# Patient Record
Sex: FEMALE | Race: BLACK OR AFRICAN AMERICAN | NOT HISPANIC OR LATINO | Employment: STUDENT | URBAN - METROPOLITAN AREA
[De-identification: names, ages, dates, MRNs, and addresses within clinical notes are randomized per-mention and may not be internally consistent; named-entity substitution may affect disease eponyms.]

---

## 2018-01-20 ENCOUNTER — OFFICE VISIT (OUTPATIENT)
Dept: URGENT CARE | Facility: URGENT CARE | Age: 23
End: 2018-01-20
Payer: COMMERCIAL

## 2018-01-20 VITALS
HEIGHT: 63 IN | OXYGEN SATURATION: 97 % | HEART RATE: 89 BPM | BODY MASS INDEX: 27.12 KG/M2 | TEMPERATURE: 98.2 F | SYSTOLIC BLOOD PRESSURE: 102 MMHG | DIASTOLIC BLOOD PRESSURE: 70 MMHG | WEIGHT: 153.1 LBS | RESPIRATION RATE: 16 BRPM

## 2018-01-20 DIAGNOSIS — K52.9 GASTROENTERITIS: Primary | ICD-10-CM

## 2018-01-20 PROCEDURE — 99203 OFFICE O/P NEW LOW 30 MIN: CPT | Performed by: NURSE PRACTITIONER

## 2018-01-20 ASSESSMENT — ENCOUNTER SYMPTOMS
CONSTITUTIONAL NEGATIVE: 1
PSYCHIATRIC NEGATIVE: 1
HEARTBURN: 1
MUSCULOSKELETAL NEGATIVE: 1
DIARRHEA: 1
ABDOMINAL PAIN: 1
CARDIOVASCULAR NEGATIVE: 1
RESPIRATORY NEGATIVE: 1
NAUSEA: 1
NEUROLOGICAL NEGATIVE: 1

## 2018-01-20 NOTE — NURSING NOTE
"Chief Complaint   Patient presents with     Vomiting     vomiting and stomach upset for over a week, chills and fevers, looser stools, terrible heart burn x 1 week       Initial /70  Pulse 89  Temp 98.2  F (36.8  C)  Resp 16  Ht 5' 3\" (1.6 m)  Wt 153 lb 1.6 oz (69.4 kg)  SpO2 97%  BMI 27.12 kg/m2 Estimated body mass index is 27.12 kg/(m^2) as calculated from the following:    Height as of this encounter: 5' 3\" (1.6 m).    Weight as of this encounter: 153 lb 1.6 oz (69.4 kg).  Medication Reconciliation: complete      Rosaura Lara, GEGE    "

## 2018-01-20 NOTE — PATIENT INSTRUCTIONS
Noninfectious Gastroenteritis (Ages 6 Years to Adult)    Gastroenteritis can cause nausea, vomiting, diarrhea, and abdominal cramping. This may occur as a result of food sensitivity, inflammation of your gastrointestinal tract, medicines, stress, or other causes not related to infection. Your symptoms will usually last from 1 to 3 days, but can last longer. Antibiotics are not effective, but simple home treatment will be helpful.  Home care  Medicine    You may use acetaminophen or NSAID medicines like ibuprofen or naproxen to control fever, unless another medicine is prescribed. (Note: If you have chronic liver or kidney disease, or ever had a stomach ulcer or gastrointestinalI bleeding, talk with your healthcare provider before using these medicines.) Aspirin should never be used in anyone under 18 years of age who is ill with a fever. It may cause severe liver damage. Don't increase your NSAID medicines if you are already taking these medicines for another condition (like arthritis). Don't use NSAIDS if you are on aspirin (such as for heart disease, or after a stroke).    If medicines for diarrhea or vomiting are prescribed, take only as directed.  General care and preventing spread of the illness    If symptoms are severe, rest at home for the next 24 hours or until you feel better.    Hand washing with soap and water is the best way to prevent the spread of infection. Wash your hands after touching anyone who is sick.    Wash your hands after using the toilet and before meals. Clean the toilet after each use.    Caffeine, tobacco, and alcohol can make your diarrhea, cramping, and pain worse.  Diet    Water and clear liquids are important so you do not get dehydrated. Drink a small amount at a time.    Do not force yourself to eat, especially if you have cramps, vomiting, or diarrhea. When you finally decide to start eating, do not eat large amounts at a time, even if you are hungry.    If you eat, avoid  fatty, greasy, spicy, or fried foods.    Do not eat dairy products if you have diarrhea; they can make the diarrhea worse.  During the first 24 hours (the first full day), follow the diet below:    Beverages: Water, clear liquids, soft drinks without caffeine, like ginger ale; mineral water (plain or flavored); decaffeinated tea and coffee.    Soups: Clear broth, consommé, and bouillon Sports drinks aren't a good choice because they have too much sugar and not enough electrolytes. In this case, commercially available products called oral rehydration solutions are best.    Desserts: Plain gelatin, popsicles, and fruit juice bars.  During the next 24 hours (the second day), you may add the following to the above if you have improved. If not, continue what you did the first day:    Hot cereal, plain toast, bread, rolls, crackers    Plain noodles, rice, mashed potatoes, chicken noodle or rice soup    Unsweetened canned fruit (avoid pineapple), bananas    Limit caffeine and chocolate. No spices or seasonings except salt.  During the next 24 hours    Gradually resume a normal diet, as you feel better and your symptoms improve.    If at any time your symptoms start getting worse, go back to clear liquids until you feel better.  Food preparation    If you have diarrhea, you should not prepare food for others. When you  prepare food for yourself, wash your hands before and after.    Wash your hands after using cutting boards, countertops, and knives that have been in contact with raw food.    Keep uncooked meats away from cooked and ready-to-eat foods.  Follow-up care  Follow up with your healthcare provider if you are not improving over the next 2 to 3 days, or as advised. If a stool (diarrhea) sample was taken, call for the results as directed.  When to seek medical care  Call your healthcare provider right away if any of these occur:     Increasing abdominal pain or constant lower right abdominal pain    Continued  vomiting (unable to keep liquids down)    Frequent diarrhea (more than 5 times a day)    Blood in vomit or stool (black or red color)    Inability to tolerate solid food after a few days.    Dark urine, reduced urine output    Weakness, dizziness    Drowsiness    Fever of 100.4 F (38.0 C) or higher, or as directed by your healthcare provider    New rash  Call 911  Call 911 if any of these occur:    Trouble breathing    Chest pain    Confusion    Severe drowsiness or trouble awakening    Seizure    Stiff neck  Date Last Reviewed: 11/16/2015 2000-2017 [employer]. [address] [place] [address]. All rights reserved. This information is not intended as a substitute for professional medical care. Always follow your healthcare professional's instructions.        Tips to Control Acid Reflux    To control acid reflux, you ll need to make some basic diet and lifestyle changes. The simple steps outlined below may be all you ll need to ease discomfort.  Watch what you eat    Avoid fatty foods and spicy foods.    Eat fewer acidic foods, such as citrus and tomato-based foods. These can increase symptoms.    Limit drinking alcohol, caffeine, and fizzy beverages. All increase acid reflux.    Try limiting chocolate, peppermint, and spearmint. These can worsen acid reflux in some people.  Watch when you eat    Avoid lying down for 3 hours after eating.    Do not snack before going to bed.  Raise your head  Raising your head and upper body by 4 to 6 inches helps limit reflux when you re lying down. Put blocks under the head of your bed frame to raise it.  Other changes    Lose weight, if you need to    Don t exercise near bedtime    Avoid tight-fitting clothes    Limit aspirin and ibuprofen    Stop smoking   Date Last Reviewed: 7/1/2016 2000-2017 [employer]. [address] [place] [address]. All rights reserved. This information is not intended as a substitute for professional  medical care. Always follow your healthcare professional's instructions.        What Is GERD?     With GERD, the weak LES allows food and fluids to travel back, or reflux, into the esophagus.      If you often have a painful burning feeling in your chest after you eat, you may have gastroesophageal reflux disease (GERD). Heartburn that keeps coming back is a classic symptom of GERD. But you may have other symptoms as well. A GERD diagnosis is made only after a complete evaluation by your healthcare provider.  Note: Chest pain may also be caused by heart problems. Be sure to have all chest pain evaluated by a healthcare provider.   When you have a reflux problem  After you eat, food travels from your mouth down the esophagus to your stomach. Along the way, food passes through a one-way valve called the lower esophageal sphincter (LES). The LES sits at the opening to your stomach. Normally the LES opens when you swallow. It lets food enter the stomach, then closes quickly. With GERD, the LES doesn t work normally. It lets food and stomach acid flow back (reflux) into the esophagus.  Some common symptoms    Frequent heartburn or burping    Sour-tasting fluid backing up into your mouth    Symptoms that get worse after you eat, bend over, or lie down    Trouble swallowing or pain when swallowing    A dry, long-term (chronic) cough    Upset stomach (nausea) or vomiting  Relieving your discomfort  You and your healthcare provider can work together to find the treatment options that best ease your symptoms. These may include lifestyle changes, medicine, and possibly surgery.  Many people find their GERD symptoms decrease when they eat small frequent meals instead of 3 large ones. Reducing the amount of fatty foods in your diet will also help.   The following foods tend to cause problems for people diagnosed with GERD:    Tomatoes and tomato products    Alcohol    Coffee    Peppermint    Greasy or spicy foods  Talk with your  provider if you don t understand how to make the dietary changes needed to control your GERD symptoms. Your provider can refer you to a nutritionist.  Date Last Reviewed: 7/1/2016 2000-2017 [employer]. [address] [place] [address], [place] [address]. All rights reserved. This information is not intended as a substitute for professional medical care. Always follow your healthcare professional's instructions.

## 2018-01-20 NOTE — PROGRESS NOTES
"SUBJECTIVE:   Fiona Mak is a 22 year old female presenting with a chief complaint of   Chief Complaint   Patient presents with     Vomiting     vomiting and stomach upset for over a week, chills and fevers, looser stools, terrible heart burn x 1 week   .    Onset of symptoms was 1 week(s) ago.  Course of illness is improving.    Severity moderately severe  Current and Associated symptoms:  cramping, diarrhea and heartburn  Aggravating factors: spicy foods, fatty foods and lying flat, worse in morning.    Alleviating factors:antacids, belching, flatus and bowel movements  Diarrhea: Yes  3 stools/day and is resolved  Stools: frequent  Vomitting: Yes  3 times/day and is resolved  Appetite: decreased  Risk factors: hx of gastritis/reflux--not treated currently      Review of Systems   Constitutional: Negative.    HENT: Negative.    Respiratory: Negative.    Cardiovascular: Negative.    Gastrointestinal: Positive for abdominal pain, diarrhea, heartburn and nausea.   Genitourinary: Negative.    Musculoskeletal: Negative.    Skin: Negative.    Neurological: Negative.    Psychiatric/Behavioral: Negative.          No past medical history on file.  Current Outpatient Prescriptions   Medication Sig Dispense Refill     ALPRAZolam (XANAX PO)        Methylphenidate HCl (RITALIN PO)        BuPROPion HCl (WELLBUTRIN PO) Take 150 mg by mouth daily       Social History   Substance Use Topics     Smoking status: Never Smoker     Smokeless tobacco: Not on file      Comment: non smoking home     Alcohol use Not on file       OBJECTIVE  /70  Pulse 89  Temp 98.2  F (36.8  C)  Resp 16  Ht 5' 3\" (1.6 m)  Wt 153 lb 1.6 oz (69.4 kg)  SpO2 97%  BMI 27.12 kg/m2    Physical Exam   Constitutional: She is oriented to person, place, and time and well-developed, well-nourished, and in no distress. No distress.   HENT:   Head: Normocephalic and atraumatic.   Eyes: Conjunctivae and EOM are normal. Pupils are equal, round, and reactive " to light.   Neck: Normal range of motion. Neck supple.   Cardiovascular: Normal rate, regular rhythm and normal heart sounds.    Pulmonary/Chest: Effort normal and breath sounds normal.   Abdominal: Soft. She exhibits no distension and no mass. There is tenderness. There is no rebound and no guarding.   Musculoskeletal: Normal range of motion. She exhibits no edema.   Lymphadenopathy:     She has no cervical adenopathy.   Neurological: She is alert and oriented to person, place, and time.   Skin: Skin is warm and dry. No rash noted. She is not diaphoretic. No erythema. No pallor.   Psychiatric: Affect normal.       Labs:  No results found for this or any previous visit (from the past 24 hour(s)).    X-Ray was not done.    ASSESSMENT:      ICD-10-CM    1. Gastroenteritis K52.9 omeprazole (PRILOSEC) 20 MG CR capsule     Probiotic Product (ACIDOPHILUS PROBIOTIC BLEND) TABS        Medical Decision Making:    Differential Diagnosis:  viral gastroenteritis    Serious Comorbid Conditions:  Adult:  reflux    PLAN:    Gastro: BRAT Diet, Omeprazole/PPI and probiotics  PPI for 2 weeks, then transition to H2 blocker, follow up with PCP if recurrs    Followup:    If not improving or if condition worsens, follow up with your Primary Care Provider    Patient Instructions       Noninfectious Gastroenteritis (Ages 6 Years to Adult)    Gastroenteritis can cause nausea, vomiting, diarrhea, and abdominal cramping. This may occur as a result of food sensitivity, inflammation of your gastrointestinal tract, medicines, stress, or other causes not related to infection. Your symptoms will usually last from 1 to 3 days, but can last longer. Antibiotics are not effective, but simple home treatment will be helpful.  Home care  Medicine    You may use acetaminophen or NSAID medicines like ibuprofen or naproxen to control fever, unless another medicine is prescribed. (Note: If you have chronic liver or kidney disease, or ever had a stomach ulcer  or gastrointestinalI bleeding, talk with your healthcare provider before using these medicines.) Aspirin should never be used in anyone under 18 years of age who is ill with a fever. It may cause severe liver damage. Don't increase your NSAID medicines if you are already taking these medicines for another condition (like arthritis). Don't use NSAIDS if you are on aspirin (such as for heart disease, or after a stroke).    If medicines for diarrhea or vomiting are prescribed, take only as directed.  General care and preventing spread of the illness    If symptoms are severe, rest at home for the next 24 hours or until you feel better.    Hand washing with soap and water is the best way to prevent the spread of infection. Wash your hands after touching anyone who is sick.    Wash your hands after using the toilet and before meals. Clean the toilet after each use.    Caffeine, tobacco, and alcohol can make your diarrhea, cramping, and pain worse.  Diet    Water and clear liquids are important so you do not get dehydrated. Drink a small amount at a time.    Do not force yourself to eat, especially if you have cramps, vomiting, or diarrhea. When you finally decide to start eating, do not eat large amounts at a time, even if you are hungry.    If you eat, avoid fatty, greasy, spicy, or fried foods.    Do not eat dairy products if you have diarrhea; they can make the diarrhea worse.  During the first 24 hours (the first full day), follow the diet below:    Beverages: Water, clear liquids, soft drinks without caffeine, like ginger ale; mineral water (plain or flavored); decaffeinated tea and coffee.    Soups: Clear broth, consommé, and bouillon Sports drinks aren't a good choice because they have too much sugar and not enough electrolytes. In this case, commercially available products called oral rehydration solutions are best.    Desserts: Plain gelatin, popsicles, and fruit juice bars.  During the next 24 hours (the second  day), you may add the following to the above if you have improved. If not, continue what you did the first day:    Hot cereal, plain toast, bread, rolls, crackers    Plain noodles, rice, mashed potatoes, chicken noodle or rice soup    Unsweetened canned fruit (avoid pineapple), bananas    Limit caffeine and chocolate. No spices or seasonings except salt.  During the next 24 hours    Gradually resume a normal diet, as you feel better and your symptoms improve.    If at any time your symptoms start getting worse, go back to clear liquids until you feel better.  Food preparation    If you have diarrhea, you should not prepare food for others. When you  prepare food for yourself, wash your hands before and after.    Wash your hands after using cutting boards, countertops, and knives that have been in contact with raw food.    Keep uncooked meats away from cooked and ready-to-eat foods.  Follow-up care  Follow up with your healthcare provider if you are not improving over the next 2 to 3 days, or as advised. If a stool (diarrhea) sample was taken, call for the results as directed.  When to seek medical care  Call your healthcare provider right away if any of these occur:     Increasing abdominal pain or constant lower right abdominal pain    Continued vomiting (unable to keep liquids down)    Frequent diarrhea (more than 5 times a day)    Blood in vomit or stool (black or red color)    Inability to tolerate solid food after a few days.    Dark urine, reduced urine output    Weakness, dizziness    Drowsiness    Fever of 100.4 F (38.0 C) or higher, or as directed by your healthcare provider    New rash  Call 911  Call 911 if any of these occur:    Trouble breathing    Chest pain    Confusion    Severe drowsiness or trouble awakening    Seizure    Stiff neck  Date Last Reviewed: 11/16/2015 2000-2017 [employer]. [address] [place] [address], [place] [address]. All rights reserved. This information is not intended  as a substitute for professional medical care. Always follow your healthcare professional's instructions.        Tips to Control Acid Reflux    To control acid reflux, you ll need to make some basic diet and lifestyle changes. The simple steps outlined below may be all you ll need to ease discomfort.  Watch what you eat    Avoid fatty foods and spicy foods.    Eat fewer acidic foods, such as citrus and tomato-based foods. These can increase symptoms.    Limit drinking alcohol, caffeine, and fizzy beverages. All increase acid reflux.    Try limiting chocolate, peppermint, and spearmint. These can worsen acid reflux in some people.  Watch when you eat    Avoid lying down for 3 hours after eating.    Do not snack before going to bed.  Raise your head  Raising your head and upper body by 4 to 6 inches helps limit reflux when you re lying down. Put blocks under the head of your bed frame to raise it.  Other changes    Lose weight, if you need to    Don t exercise near bedtime    Avoid tight-fitting clothes    Limit aspirin and ibuprofen    Stop smoking   Date Last Reviewed: 7/1/2016 2000-2017 [employer]. [address] [place] [address]. All rights reserved. This information is not intended as a substitute for professional medical care. Always follow your healthcare professional's instructions.        What Is GERD?     With GERD, the weak LES allows food and fluids to travel back, or reflux, into the esophagus.      If you often have a painful burning feeling in your chest after you eat, you may have gastroesophageal reflux disease (GERD). Heartburn that keeps coming back is a classic symptom of GERD. But you may have other symptoms as well. A GERD diagnosis is made only after a complete evaluation by your healthcare provider.  Note: Chest pain may also be caused by heart problems. Be sure to have all chest pain evaluated by a healthcare provider.   When you have a reflux problem  After you  eat, food travels from your mouth down the esophagus to your stomach. Along the way, food passes through a one-way valve called the lower esophageal sphincter (LES). The LES sits at the opening to your stomach. Normally the LES opens when you swallow. It lets food enter the stomach, then closes quickly. With GERD, the LES doesn t work normally. It lets food and stomach acid flow back (reflux) into the esophagus.  Some common symptoms    Frequent heartburn or burping    Sour-tasting fluid backing up into your mouth    Symptoms that get worse after you eat, bend over, or lie down    Trouble swallowing or pain when swallowing    A dry, long-term (chronic) cough    Upset stomach (nausea) or vomiting  Relieving your discomfort  You and your healthcare provider can work together to find the treatment options that best ease your symptoms. These may include lifestyle changes, medicine, and possibly surgery.  Many people find their GERD symptoms decrease when they eat small frequent meals instead of 3 large ones. Reducing the amount of fatty foods in your diet will also help.   The following foods tend to cause problems for people diagnosed with GERD:    Tomatoes and tomato products    Alcohol    Coffee    Peppermint    Greasy or spicy foods  Talk with your provider if you don t understand how to make the dietary changes needed to control your GERD symptoms. Your provider can refer you to a nutritionist.  Date Last Reviewed: 7/1/2016 2000-2017 [employer]. [address] [place] [address], [place] [address]. All rights reserved. This information is not intended as a substitute for professional medical care. Always follow your healthcare professional's instructions.

## 2018-01-20 NOTE — MR AVS SNAPSHOT
After Visit Summary   1/20/2018    Fiona Mak    MRN: [id number]           Patient Information     Date Of Birth          1995        Visit Information        Provider Department      1/20/2018 9:20 AM Amarilis Rodriguez APRN [facility]        Today's Diagnoses     Gastroenteritis    -  1      Care Instructions      Noninfectious Gastroenteritis (Ages 6 Years to Adult)    Gastroenteritis can cause nausea, vomiting, diarrhea, and abdominal cramping. This may occur as a result of food sensitivity, inflammation of your gastrointestinal tract, medicines, stress, or other causes not related to infection. Your symptoms will usually last from 1 to 3 days, but can last longer. Antibiotics are not effective, but simple home treatment will be helpful.  Home care  Medicine    You may use acetaminophen or NSAID medicines like ibuprofen or naproxen to control fever, unless another medicine is prescribed. (Note: If you have chronic liver or kidney disease, or ever had a stomach ulcer or gastrointestinalI bleeding, talk with your healthcare provider before using these medicines.) Aspirin should never be used in anyone under 18 years of age who is ill with a fever. It may cause severe liver damage. Don't increase your NSAID medicines if you are already taking these medicines for another condition (like arthritis). Don't use NSAIDS if you are on aspirin (such as for heart disease, or after a stroke).    If medicines for diarrhea or vomiting are prescribed, take only as directed.  General care and preventing spread of the illness    If symptoms are severe, rest at home for the next 24 hours or until you feel better.    Hand washing with soap and water is the best way to prevent the spread of infection. Wash your hands after touching anyone who is sick.    Wash your hands after using the toilet and before meals. Clean the toilet after each use.    Caffeine, tobacco, and  alcohol can make your diarrhea, cramping, and pain worse.  Diet    Water and clear liquids are important so you do not get dehydrated. Drink a small amount at a time.    Do not force yourself to eat, especially if you have cramps, vomiting, or diarrhea. When you finally decide to start eating, do not eat large amounts at a time, even if you are hungry.    If you eat, avoid fatty, greasy, spicy, or fried foods.    Do not eat dairy products if you have diarrhea; they can make the diarrhea worse.  During the first 24 hours (the first full day), follow the diet below:    Beverages: Water, clear liquids, soft drinks without caffeine, like ginger ale; mineral water (plain or flavored); decaffeinated tea and coffee.    Soups: Clear broth, consommé, and bouillon Sports drinks aren't a good choice because they have too much sugar and not enough electrolytes. In this case, commercially available products called oral rehydration solutions are best.    Desserts: Plain gelatin, popsicles, and fruit juice bars.  During the next 24 hours (the second day), you may add the following to the above if you have improved. If not, continue what you did the first day:    Hot cereal, plain toast, bread, rolls, crackers    Plain noodles, rice, mashed potatoes, chicken noodle or rice soup    Unsweetened canned fruit (avoid pineapple), bananas    Limit caffeine and chocolate. No spices or seasonings except salt.  During the next 24 hours    Gradually resume a normal diet, as you feel better and your symptoms improve.    If at any time your symptoms start getting worse, go back to clear liquids until you feel better.  Food preparation    If you have diarrhea, you should not prepare food for others. When you  prepare food for yourself, wash your hands before and after.    Wash your hands after using cutting boards, countertops, and knives that have been in contact with raw food.    Keep uncooked meats away from cooked and ready-to-eat  foods.  Follow-up care  Follow up with your healthcare provider if you are not improving over the next 2 to 3 days, or as advised. If a stool (diarrhea) sample was taken, call for the results as directed.  When to seek medical care  Call your healthcare provider right away if any of these occur:     Increasing abdominal pain or constant lower right abdominal pain    Continued vomiting (unable to keep liquids down)    Frequent diarrhea (more than 5 times a day)    Blood in vomit or stool (black or red color)    Inability to tolerate solid food after a few days.    Dark urine, reduced urine output    Weakness, dizziness    Drowsiness    Fever of 100.4 F (38.0 C) or higher, or as directed by your healthcare provider    New rash  Call 911  Call 911 if any of these occur:    Trouble breathing    Chest pain    Confusion    Severe drowsiness or trouble awakening    Seizure    Stiff neck  Date Last Reviewed: 11/16/2015 2000-2017 [employer]. [address] [place] [address]. All rights reserved. This information is not intended as a substitute for professional medical care. Always follow your healthcare professional's instructions.        Tips to Control Acid Reflux    To control acid reflux, you ll need to make some basic diet and lifestyle changes. The simple steps outlined below may be all you ll need to ease discomfort.  Watch what you eat    Avoid fatty foods and spicy foods.    Eat fewer acidic foods, such as citrus and tomato-based foods. These can increase symptoms.    Limit drinking alcohol, caffeine, and fizzy beverages. All increase acid reflux.    Try limiting chocolate, peppermint, and spearmint. These can worsen acid reflux in some people.  Watch when you eat    Avoid lying down for 3 hours after eating.    Do not snack before going to bed.  Raise your head  Raising your head and upper body by 4 to 6 inches helps limit reflux when you re lying down. Put blocks under the head of your  bed frame to raise it.  Other changes    Lose weight, if you need to    Don t exercise near bedtime    Avoid tight-fitting clothes    Limit aspirin and ibuprofen    Stop smoking   Date Last Reviewed: 7/1/2016 2000-2017 The "[employer]. [address] [place] [address], [place] [address]. All rights reserved. This information is not intended as a substitute for professional medical care. Always follow your healthcare professional's instructions.        What Is GERD?     With GERD, the weak LES allows food and fluids to travel back, or reflux, into the esophagus.      If you often have a painful burning feeling in your chest after you eat, you may have gastroesophageal reflux disease (GERD). Heartburn that keeps coming back is a classic symptom of GERD. But you may have other symptoms as well. A GERD diagnosis is made only after a complete evaluation by your healthcare provider.  Note: Chest pain may also be caused by heart problems. Be sure to have all chest pain evaluated by a healthcare provider.   When you have a reflux problem  After you eat, food travels from your mouth down the esophagus to your stomach. Along the way, food passes through a one-way valve called the lower esophageal sphincter (LES). The LES sits at the opening to your stomach. Normally the LES opens when you swallow. It lets food enter the stomach, then closes quickly. With GERD, the LES doesn t work normally. It lets food and stomach acid flow back (reflux) into the esophagus.  Some common symptoms    Frequent heartburn or burping    Sour-tasting fluid backing up into your mouth    Symptoms that get worse after you eat, bend over, or lie down    Trouble swallowing or pain when swallowing    A dry, long-term (chronic) cough    Upset stomach (nausea) or vomiting  Relieving your discomfort  You and your healthcare provider can work together to find the treatment options that best ease your symptoms. These may include lifestyle changes,  "medicine, and possibly surgery.  Many people find their GERD symptoms decrease when they eat small frequent meals instead of 3 large ones. Reducing the amount of fatty foods in your diet will also help.   The following foods tend to cause problems for people diagnosed with GERD:    Tomatoes and tomato products    Alcohol    Coffee    Peppermint    Greasy or spicy foods  Talk with your provider if you don t understand how to make the dietary changes needed to control your GERD symptoms. Your provider can refer you to a nutritionist.  Date Last Reviewed: 7/1/2016 2000-2017 [employer]. [address] [place] [address]. All rights reserved. This information is not intended as a substitute for professional medical care. Always follow your healthcare professional's instructions.                Follow-ups after your visit        Who to contact     If you have questions or need follow up information about today's clinic visit or your schedule please contact [facility] directly at [phone].  Normal or non-critical lab and imaging results will be communicated to you by MyChart, letter or phone within 4 business days after the clinic has received the results. If you do not hear from us within 7 days, please contact the clinic through [employer] or phone. If you have a critical or abnormal lab result, we will notify you by phone as soon as possible.  Submit refill requests through [employer] or call your pharmacy and they will forward the refill request to us. Please allow 3 business days for your refill to be completed.          Additional Information About Your Visit        [employer] Information     [employer] lets you send messages to your doctor, view your test results, renew your prescriptions, schedule appointments and more. To sign up, go to [email or web address] . Click on \"Log in\" on the left side of the screen, which will take you to the Welcome page. Then click on " "\"Sign up Now\" on the right side of the page.     You will be asked to enter the access code listed below, as well as some personal information. Please follow the directions to create your username and password.     Your access code is: [email or web address]  Expires: 2018 10:08 AM     Your access code will  in 90 days. If you need help or a new code, please call your [facility] clinic or [phone].        Care EveryWhere ID     This is your Care EveryWhere ID. This could be used by other organizations to access your [facility] medical records  [id number]        Your Vitals Were     Pulse Temperature Respirations Height Pulse Oximetry BMI (Body Mass Index)    89 98.2  F (36.8  C) 16 5' 3\" (1.6 m) 97% 27.12 kg/m2       Blood Pressure from Last 3 Encounters:   18 102/70   02/07/15 111/74   12/15/12 92/60    Weight from Last 3 Encounters:   18 153 lb 1.6 oz (69.4 kg)   16 160 lb (72.6 kg)   02/06/15 160 lb (72.6 kg) (88 %)*     * Growth percentiles are based on CDC 2-20 Years data.              Today, you had the following     No orders found for display         Today's Medication Changes          These changes are accurate as of: 18 10:08 AM.  If you have any questions, ask your nurse or doctor.               Start taking these medicines.        Dose/Directions    ACIDOPHILUS PROBIOTIC BLEND Tabs   Used for:  Gastroenteritis        Dose:  1 tablet   Take 1 tablet by mouth 2 times daily   Quantity:  30 tablet   Refills:  0       omeprazole 20 MG CR capsule   Commonly known as:  priLOSEC   Used for:  Gastroenteritis        Dose:  20 mg   Take 1 capsule (20 mg) by mouth daily   Quantity:  14 capsule   Refills:  0            Where to get your medicines      These medications were sent to [facility]/pharmacy #[id number] - [place] [id number] [address]     Phone:  [phone]     ACIDOPHILUS PROBIOTIC BLEND Tabs    omeprazole 20 MG CR capsule                " Primary Care Provider Office Phone # Fax #    [facility] [phone]       [facility] [address]        Equal Access to Services     DIDI HOOK : Hadii aad ku hadcas Will, varshada luqjaime, luzta kaelbada ruddy, lalo grier bernarddavid espinal geraldine leon. So [facility] [phone].    ATENCIÓN: Si habla español, tiene a torres disposición servicios gratuitos de asistencia lingüística. Llame al [phone].    We comply with applicable federal civil rights laws and [place] laws. We do not discriminate on the basis of race, color, national origin, age, disability, sex, sexual orientation, or gender identity.            Thank you!     Thank you for choosing [facility]  for your care. Our goal is always to provide you with excellent care. Hearing back from our patients is one way we can continue to improve our services. Please take a few minutes to complete the written survey that you may receive in the mail after your visit with us. Thank you!             Your Updated Medication List - Protect others around you: Learn how to safely use, store and throw away your medicines at www.disposemymeds.org.          This list is accurate as of: 1/20/18 10:08 AM.  Always use your most recent med list.                   Brand Name Dispense Instructions for use Diagnosis    ACIDOPHILUS PROBIOTIC BLEND Tabs     30 tablet    Take 1 tablet by mouth 2 times daily    Gastroenteritis       omeprazole 20 MG CR capsule    priLOSEC    14 capsule    Take 1 capsule (20 mg) by mouth daily    Gastroenteritis       RITALIN PO           WELLBUTRIN PO      Take 150 mg by mouth daily        XANAX PO

## 2019-02-08 ENCOUNTER — OFFICE VISIT (OUTPATIENT)
Dept: URGENT CARE | Facility: URGENT CARE | Age: 24
End: 2019-02-08
Payer: COMMERCIAL

## 2019-02-08 VITALS
WEIGHT: 203.5 LBS | SYSTOLIC BLOOD PRESSURE: 110 MMHG | OXYGEN SATURATION: 98 % | HEART RATE: 87 BPM | RESPIRATION RATE: 16 BRPM | TEMPERATURE: 98.1 F | DIASTOLIC BLOOD PRESSURE: 74 MMHG | BODY MASS INDEX: 36.05 KG/M2

## 2019-02-08 DIAGNOSIS — B37.31 YEAST VAGINITIS: Primary | ICD-10-CM

## 2019-02-08 DIAGNOSIS — N94.9 VAGINAL SYMPTOM: ICD-10-CM

## 2019-02-08 DIAGNOSIS — N39.0 URINARY TRACT INFECTION WITHOUT HEMATURIA, SITE UNSPECIFIED: ICD-10-CM

## 2019-02-08 LAB
ALBUMIN UR-MCNC: NEGATIVE MG/DL
APPEARANCE UR: ABNORMAL
BACTERIA #/AREA URNS HPF: ABNORMAL /HPF
BILIRUB UR QL STRIP: NEGATIVE
COLOR UR AUTO: YELLOW
GLUCOSE UR STRIP-MCNC: NEGATIVE MG/DL
HGB UR QL STRIP: ABNORMAL
KETONES UR STRIP-MCNC: NEGATIVE MG/DL
LEUKOCYTE ESTERASE UR QL STRIP: ABNORMAL
NITRATE UR QL: NEGATIVE
NON-SQ EPI CELLS #/AREA URNS LPF: ABNORMAL /LPF
PH UR STRIP: 7.5 PH (ref 5–7)
RBC #/AREA URNS AUTO: ABNORMAL /HPF
SOURCE: ABNORMAL
SP GR UR STRIP: 1.02 (ref 1–1.03)
SPECIMEN SOURCE: ABNORMAL
UROBILINOGEN UR STRIP-ACNC: 0.2 EU/DL (ref 0.2–1)
WBC #/AREA URNS AUTO: ABNORMAL /HPF
WET PREP SPEC: ABNORMAL

## 2019-02-08 PROCEDURE — 99213 OFFICE O/P EST LOW 20 MIN: CPT | Performed by: PHYSICIAN ASSISTANT

## 2019-02-08 PROCEDURE — 81001 URINALYSIS AUTO W/SCOPE: CPT | Performed by: PHYSICIAN ASSISTANT

## 2019-02-08 PROCEDURE — 87210 SMEAR WET MOUNT SALINE/INK: CPT | Performed by: PHYSICIAN ASSISTANT

## 2019-02-08 RX ORDER — OLOPATADINE HYDROCHLORIDE 1 MG/ML
SOLUTION/ DROPS OPHTHALMIC
Refills: 0 | COMMUNITY
Start: 2018-07-31 | End: 2019-12-25

## 2019-02-08 RX ORDER — SULFAMETHOXAZOLE/TRIMETHOPRIM 800-160 MG
1 TABLET ORAL 2 TIMES DAILY
Qty: 6 TABLET | Refills: 0 | Status: SHIPPED | OUTPATIENT
Start: 2019-02-08 | End: 2019-02-11

## 2019-02-08 RX ORDER — LISDEXAMFETAMINE DIMESYLATE 20 MG/1
CAPSULE ORAL
Refills: 0 | COMMUNITY
Start: 2018-10-12 | End: 2019-12-25

## 2019-02-08 RX ORDER — ALPRAZOLAM 0.5 MG
TABLET ORAL
Refills: 0 | COMMUNITY
Start: 2018-12-07 | End: 2019-12-25

## 2019-02-08 RX ORDER — FLUCONAZOLE 150 MG/1
150 TABLET ORAL ONCE
Qty: 1 TABLET | Refills: 0 | Status: SHIPPED | OUTPATIENT
Start: 2019-02-08 | End: 2019-02-08

## 2019-02-08 RX ORDER — FLUTICASONE PROPIONATE 50 MCG
SPRAY, SUSPENSION (ML) NASAL
Refills: 0 | COMMUNITY
Start: 2018-06-09 | End: 2019-12-25

## 2019-02-08 RX ORDER — LISDEXAMFETAMINE DIMESYLATE 30 MG/1
CAPSULE ORAL
Refills: 0 | COMMUNITY
Start: 2018-12-12 | End: 2019-12-25

## 2019-02-09 NOTE — PROGRESS NOTES
S: 24 yo female is here for vaginal itching/burning and dysuria for 2 weeks.  LMP January/18/2018 which was normal.  No fever.  No pelvic pain.  No flank pain.  No headache or dizziness.        No Known Allergies    No past medical history on file.      Current Outpatient Medications on File Prior to Visit:  VYVANSE 30 MG capsule TAKE ONE CAPSULE BY MOUTH EVERY DAY FILL 10/20/2018   ALPRAZolam (XANAX) 0.5 MG tablet TAKE 1 TABLET BY MOUTH 2 TIMES DAILY IF NEEDED.   benzoyl peroxide 5 % external liquid    BuPROPion HCl (WELLBUTRIN PO) Take 150 mg by mouth daily   FLUoxetine (PROZAC) 20 MG capsule TAKE 1 CAPSULE BY MOUTH EVERY DAY IN THE MORNING   fluticasone (FLONASE) 50 MCG/ACT nasal spray INHALE 2 SPRAYS INTO BOTH NOSTRILS ONCE DAILY.   Methylphenidate HCl (RITALIN PO)    olopatadine (PATANOL) 0.1 % ophthalmic solution INSTILL 1 DROP INTO BOTH EYES TWICE A DAY   omeprazole (PRILOSEC) 20 MG CR capsule Take 1 capsule (20 mg) by mouth daily (Patient not taking: Reported on 2/8/2019)   Probiotic Product (ACIDOPHILUS PROBIOTIC BLEND) TABS Take 1 tablet by mouth 2 times daily (Patient not taking: Reported on 2/8/2019)   VYVANSE 20 MG capsule TAKE 1 CAPSULE BY MOUTH EVERY DAY     No current facility-administered medications on file prior to visit.     Social History     Tobacco Use     Smoking status: Never Smoker     Smokeless tobacco: Never Used     Tobacco comment: non smoking home   Substance Use Topics     Alcohol use: Not on file     Drug use: Not on file       ROS:  General: negative for fever  ABD: Denies abd pain  : as above    OBJECTIVE:  /74 (BP Location: Left arm, Patient Position: Chair, Cuff Size: Adult Large)   Pulse 87   Temp 98.1  F (36.7  C) (Oral)   Resp 16   Wt 92.3 kg (203 lb 8 oz)   SpO2 98%   Breastfeeding? No   BMI 36.05 kg/m     General:   awake, alert, and cooperative.  NAD.   Head: Normocephalic, atraumatic.  Eyes: Conjunctiva clear, non icteric.   ABD: soft, no tenderness to  palpation , no rigidity, guarding or rebound . No CVAT  Neuro: Alert and oriented - normal speech.   Results for orders placed or performed in visit on 02/08/19   *UA reflex to Microscopic and Culture ([facility] (except [place] and [place])   Result Value Ref Range    Color Urine Yellow     Appearance Urine Slightly Cloudy     Glucose Urine Negative NEG^Negative mg/dL    Bilirubin Urine Negative NEG^Negative    Ketones Urine Negative NEG^Negative mg/dL    Specific Gravity Urine 1.020 1.003 - 1.035    Blood Urine Moderate (A) NEG^Negative    pH Urine 7.5 (H) 5.0 - 7.0 pH    Protein Albumin Urine Negative NEG^Negative mg/dL    Urobilinogen Urine 0.2 0.2 - 1.0 EU/dL    Nitrite Urine Negative NEG^Negative    Leukocyte Esterase Urine Small (A) NEG^Negative    Source Midstream Urine    Urine Microscopic   Result Value Ref Range    WBC Urine 0 - 5 OTO5^0 - 5 /HPF    RBC Urine 10-25 (A) OTO2^O - 2 /HPF    Squamous Epithelial /LPF Urine Few FEW^Few /LPF    Bacteria Urine Few (A) NEG^Negative /HPF   Wet prep   Result Value Ref Range    Specimen Description Vagina     Wet Prep No Trichomonas seen     Wet Prep No clue cells seen     Wet Prep Yeast seen (A)        ASSESSMENT:    ICD-10-CM    1. Yeast vaginitis B37.3 fluconazole (DIFLUCAN) 150 MG tablet     sulfamethoxazole-trimethoprim (BACTRIM DS/SEPTRA DS) 800-160 MG tablet   2. Vaginal symptom N94.9 Wet prep     *UA reflex to Microscopic and Culture ([facility] (except [place] and [place])     Urine Microscopic     fluconazole (DIFLUCAN) 150 MG tablet   3. Urinary tract infection without hematuria, site unspecified N39.0 sulfamethoxazole-trimethoprim (BACTRIM DS/SEPTRA DS) 800-160 MG tablet         PLAN:   As per ordered above.  Drink plenty of fluids.  Prevention and treatment of UTI's discussed. Follow up with primary care physician if not improving.  Advised about symptoms which might herald more serious problems.      Emilia MUSE  ISABELA Turner

## 2019-12-25 ENCOUNTER — APPOINTMENT (OUTPATIENT)
Dept: CT IMAGING | Facility: CLINIC | Age: 24
End: 2019-12-25
Attending: EMERGENCY MEDICINE
Payer: MEDICAID

## 2019-12-25 ENCOUNTER — HOSPITAL ENCOUNTER (EMERGENCY)
Facility: CLINIC | Age: 24
Discharge: HOME | End: 2019-12-26
Attending: EMERGENCY MEDICINE | Admitting: EMERGENCY MEDICINE
Payer: MEDICAID

## 2019-12-25 DIAGNOSIS — F43.0 ACUTE REACTION TO STRESS: ICD-10-CM

## 2019-12-25 LAB
ALBUMIN SERPL-MCNC: 4 G/DL (ref 3.4–5)
ALBUMIN UR-MCNC: 100 MG/DL
ALP SERPL-CCNC: 57 U/L (ref 40–150)
ALT SERPL W P-5'-P-CCNC: 16 U/L (ref 0–50)
AMPHETAMINES UR QL SCN: NEGATIVE
ANION GAP SERPL CALCULATED.3IONS-SCNC: 7 MMOL/L (ref 3–14)
APAP SERPL-MCNC: <2 MG/L (ref 10–20)
APPEARANCE UR: ABNORMAL
AST SERPL W P-5'-P-CCNC: 16 U/L (ref 0–45)
B-HCG FREE SERPL-ACNC: <5 IU/L
BARBITURATES UR QL: NEGATIVE
BASOPHILS # BLD AUTO: 0 10E9/L (ref 0–0.2)
BASOPHILS NFR BLD AUTO: 0.1 %
BENZODIAZ UR QL: NEGATIVE
BILIRUB SERPL-MCNC: 0.4 MG/DL (ref 0.2–1.3)
BILIRUB UR QL STRIP: NEGATIVE
BUN SERPL-MCNC: 10 MG/DL (ref 7–30)
CALCIUM SERPL-MCNC: 9.5 MG/DL (ref 8.5–10.1)
CANNABINOIDS UR QL SCN: POSITIVE
CHLORIDE SERPL-SCNC: 106 MMOL/L (ref 94–109)
CO2 SERPL-SCNC: 24 MMOL/L (ref 20–32)
COCAINE UR QL: NEGATIVE
COLOR UR AUTO: ABNORMAL
CREAT SERPL-MCNC: 0.71 MG/DL (ref 0.52–1.04)
DIFFERENTIAL METHOD BLD: ABNORMAL
EOSINOPHIL # BLD AUTO: 0 10E9/L (ref 0–0.7)
EOSINOPHIL NFR BLD AUTO: 0 %
ERYTHROCYTE [DISTWIDTH] IN BLOOD BY AUTOMATED COUNT: 13.2 % (ref 10–15)
GFR SERPL CREATININE-BSD FRML MDRD: >90 ML/MIN/{1.73_M2}
GLUCOSE SERPL-MCNC: 100 MG/DL (ref 70–99)
GLUCOSE UR STRIP-MCNC: NEGATIVE MG/DL
HCT VFR BLD AUTO: 38.7 % (ref 35–47)
HGB BLD-MCNC: 13 G/DL (ref 11.7–15.7)
HGB UR QL STRIP: ABNORMAL
IMM GRANULOCYTES # BLD: 0 10E9/L (ref 0–0.4)
IMM GRANULOCYTES NFR BLD: 0.2 %
KETONES UR STRIP-MCNC: 10 MG/DL
LEUKOCYTE ESTERASE UR QL STRIP: ABNORMAL
LYMPHOCYTES # BLD AUTO: 1.1 10E9/L (ref 0.8–5.3)
LYMPHOCYTES NFR BLD AUTO: 9.8 %
MCH RBC QN AUTO: 29.3 PG (ref 26.5–33)
MCHC RBC AUTO-ENTMCNC: 33.6 G/DL (ref 31.5–36.5)
MCV RBC AUTO: 87 FL (ref 78–100)
MONOCYTES # BLD AUTO: 0.6 10E9/L (ref 0–1.3)
MONOCYTES NFR BLD AUTO: 5.3 %
NEUTROPHILS # BLD AUTO: 9.4 10E9/L (ref 1.6–8.3)
NEUTROPHILS NFR BLD AUTO: 84.6 %
NITRATE UR QL: NEGATIVE
NRBC # BLD AUTO: 0 10*3/UL
NRBC BLD AUTO-RTO: 0 /100
OPIATES UR QL SCN: NEGATIVE
PCP UR QL SCN: NEGATIVE
PH UR STRIP: 6 PH (ref 5–7)
PLATELET # BLD AUTO: 322 10E9/L (ref 150–450)
POTASSIUM SERPL-SCNC: 3.9 MMOL/L (ref 3.4–5.3)
PROT SERPL-MCNC: 8.2 G/DL (ref 6.8–8.8)
RBC # BLD AUTO: 4.44 10E12/L (ref 3.8–5.2)
RBC #/AREA URNS AUTO: >182 /HPF (ref 0–2)
SALICYLATES SERPL-MCNC: <2 MG/DL
SODIUM SERPL-SCNC: 137 MMOL/L (ref 133–144)
SOURCE: ABNORMAL
SP GR UR STRIP: 1.02 (ref 1–1.03)
SQUAMOUS #/AREA URNS AUTO: 18 /HPF (ref 0–1)
UROBILINOGEN UR STRIP-MCNC: NORMAL MG/DL (ref 0–2)
WBC # BLD AUTO: 11.2 10E9/L (ref 4–11)
WBC #/AREA URNS AUTO: 24 /HPF (ref 0–5)

## 2019-12-25 PROCEDURE — 99292 CRITICAL CARE ADDL 30 MIN: CPT

## 2019-12-25 PROCEDURE — 84702 CHORIONIC GONADOTROPIN TEST: CPT

## 2019-12-25 PROCEDURE — 80329 ANALGESICS NON-OPIOID 1 OR 2: CPT | Performed by: EMERGENCY MEDICINE

## 2019-12-25 PROCEDURE — 96372 THER/PROPH/DIAG INJ SC/IM: CPT

## 2019-12-25 PROCEDURE — 99291 CRITICAL CARE FIRST HOUR: CPT | Mod: 25

## 2019-12-25 PROCEDURE — 80053 COMPREHEN METABOLIC PANEL: CPT | Performed by: EMERGENCY MEDICINE

## 2019-12-25 PROCEDURE — 85025 COMPLETE CBC W/AUTO DIFF WBC: CPT | Performed by: EMERGENCY MEDICINE

## 2019-12-25 PROCEDURE — 80307 DRUG TEST PRSMV CHEM ANLYZR: CPT | Performed by: EMERGENCY MEDICINE

## 2019-12-25 PROCEDURE — 70450 CT HEAD/BRAIN W/O DYE: CPT

## 2019-12-25 PROCEDURE — 90791 PSYCH DIAGNOSTIC EVALUATION: CPT

## 2019-12-25 PROCEDURE — 81001 URINALYSIS AUTO W/SCOPE: CPT | Performed by: EMERGENCY MEDICINE

## 2019-12-25 PROCEDURE — 25000128 H RX IP 250 OP 636: Performed by: EMERGENCY MEDICINE

## 2019-12-25 RX ORDER — OLANZAPINE 10 MG/2ML
10 INJECTION, POWDER, FOR SOLUTION INTRAMUSCULAR DAILY PRN
Status: DISCONTINUED | OUTPATIENT
Start: 2019-12-25 | End: 2019-12-26 | Stop reason: HOSPADM

## 2019-12-25 RX ORDER — OLANZAPINE 10 MG/1
10 TABLET, ORALLY DISINTEGRATING ORAL ONCE
Status: DISCONTINUED | OUTPATIENT
Start: 2019-12-25 | End: 2019-12-25

## 2019-12-25 RX ADMIN — OLANZAPINE 10 MG: 10 INJECTION, POWDER, FOR SOLUTION INTRAMUSCULAR at 17:28

## 2019-12-25 ASSESSMENT — ENCOUNTER SYMPTOMS
WOUND: 1
FEVER: 0
DYSPHORIC MOOD: 1

## 2019-12-25 ASSESSMENT — MIFFLIN-ST. JEOR: SCORE: 1458.49

## 2019-12-25 NOTE — ED NOTES
Bed: [facility]  Expected date: 12/25/19  Expected time: 10:42 AM  Means of arrival: Ambulance  Comments:  511 24f HOLD, restrained  ETA 1055

## 2019-12-25 NOTE — LETTER
December 25, 2019      To Whom It May Concern:      Fiona Mak was seen in our Emergency Department today, 12/25/19.      Sincerely,        Ancelmo Melissa MD

## 2019-12-25 NOTE — ED AVS SNAPSHOT
Emergency Department  [address] [place] [id number]  Phone:  [phone]  Fax:  [phone]                                    Fiona Mak   MRN: [id number]    Department:   Emergency Department   Date of Visit:  12/25/2019           After Visit Summary Signature Page    I have received my discharge instructions, and my questions have been answered. I have discussed any challenges I see with this plan with the nurse or doctor.    ..........................................................................................................................................  Patient/Patient Representative Signature      ..........................................................................................................................................  Patient Representative Print Name and Relationship to Patient    ..................................................               ................................................  Date                                   Time    ..........................................................................................................................................  Reviewed by Signature/Title    ...................................................              ..............................................  Date                                               Time          22EPIC Rev 08/18

## 2019-12-25 NOTE — ED PROVIDER NOTES
"  History     Chief Complaint:  Abnormal/Erratic Behavior    The history is provided by a parent and the EMS personnel. The history is limited by the condition of the patient.      Fiona Mak is a 24 year old female who presents via EMS for evaluation of abnormal behavior. This morning, the patient's mother came home from working an overnight shift to the patient \"acting strange and tearing her clothes off.\" Mom called 911 as the patient has never acted like this before. On PD's arrival, they reported the patient was fine but then suddenly \"flipped\" and tore all her clothes off and started running away. The paramedics found the patient completely naked, and unwilling to answer any questions asked of her rather she would just stare straight ahead. Here, the patient reports she got into a \"tiff\" with her mom and that mom is going through a divorce, but does not elaborate further. When prompted to discuss the event today, the patient states she is \"going through a lot\" and \"needs time to process\" all of it. After given her time, patient continues to not elaborate and states \"I don't feel like talking until my wounds are treated.\" Mother reported to EMS that the patient has no allergies, medical history, or current medications, however per chart review the patient has a history of anxiety.     Per patient's mother, the patient has been living on her own for three months now and has only told one brother where she is living. This morning, the patient drove to her mom's house and when mom arrived home, she was wearing a bedsheet on her bottom and a shirt on top; mom notes she was staring upwards and seemed to be responding to internal stimuli. When she tried to converse with the patient, patient would not respond until suddenly she stripped off her shirt and replaced it upside down. Mom does not believe the patient did any drugs, however police found an empty marijuana pipe in her car today.     Allergies:  No Known " "Drug Allergies     Medications:    Vyvanse     Past Medical History:    ADD  Anxiety    Past Surgical History:    The patient does not have any pertinent past surgical history.    Family History:    Mother: Diabetes    Social History:  Marital Status:  Single [1]  Presents to the ED with EMS. Mother and family arrived later.   Negative for tobacco use.  Negative for alcohol use.   Negative for drug use. Per mother.      Review of Systems   Constitutional: Negative for fever.   Skin: Positive for wound.   Psychiatric/Behavioral: Positive for behavioral problems and dysphoric mood.   10 point review of systems performed and is negative except as above and in HPI.       Physical Exam     Patient Vitals for the past 24 hrs:   BP Temp Temp src Heart Rate Resp SpO2 Height Weight   12/25/19 1112 121/76 97.8  F (36.6  C) Temporal 105 16 100 % 1.549 m (5' 1\") 77.1 kg (170 lb)        Physical Exam  General: Resting on the gurney, appears comfortable.   Head:  The scalp, face, and head appear normal  Mouth/Throat: Mucus membranes are moist   CV:  Regular rate    Normal S1 and S2  No pathological murmur   Resp:  Breath sounds clear and equal bilaterally    Non-labored, no retractions or accessory muscle use    No coarseness    No wheezing   GI:  Abdomen is soft, no rigidity    No tenderness to palpation  MS:  No evidence of self injury, no lacerations.   Skin:  Abrasions ot hte bilateral knees  Neuro: Speech is normal but exceedingly gaurded.     No apparent focal deficit.      Uses all extremities equally.  Psych:  Awake. Alert.      Unable to assess. Patient uncooperative with exam and refuses to answer any questions.  Very guarded.  Poor insight.  Poor judgement.    Emergency Department Course   Imaging:  Radiographic findings were communicated with the patient and family who voiced understanding of the findings.  CT Head without contrast:   No acute intracranial abnormality, as per radiology.     Laboratory:  CBC: WBC: 11.2 " (H), HGB: 13.0, PLT: 322  CMP: Glucose 100 (H), o/w WNL (Creatinine: 0.71)  Acetaminophen level: <2  Salicylate level: <2    UA with Microscopic: Ketones: 10 (A), Blood: Large (A), Albumin: 100 (A), Leukocyte esterase: Moderate (A), WBC: 24 (H), RBC: >182 (H), Squamous epithelial: 18 (H), o/w WNL   Drug abuse screen urine: Cannabinoids: Positive (A), o/w WNL    ISTAT HCG: <5.0    Procedures:  None    Emergency Department Course:  1052: Patient arrived in restraints to the ED via EMS. Paramedic notes and vitals reviewed.   1054: I performed an exam of the patient as documented above.      IV was inserted and blood was drawn for laboratory testing, results above.   The patient provided a urine sample here in the emergency department. This was sent for laboratory testing, findings above.   The patient was sent for a head CT while in the emergency department, results above.      1251: I discussed the plan going forwards for the patient with the DEC . If the urine drug screen comes back negative, she will evaluate the patient.     1330: I spoke with the DEC  and given the change in the patient's behavior between my exam and their assessment, we will plan on having a repeat DEC assessment at 1600.     Patient's care was signed out to Dr. Suárez at shift change pending repeat DEC assessment.     Impression & Plan      Medical Decision Making:  Fiona Mak is a 24 year old female who presents for evaluation of bizarre behavior.  They have no history of previous psychiatric illness other than ADD and anxiety but at this point appear decompensated psychiatrically.  The patient is exceptionally guarded and will not answer my questions, however, her behavior is concerning for psychosis.    They no signs at this point of suicide attempt or ingestion, corroberated by the laboratory data above.      The patient was seen by DEC and continued to be quite guarded but had more logical answers to questioning. The  patient's behavior varied between my exam and the DEC assessment so plan will be repeat assessment at 1600. Patient's care signed out to my colleague at shift change pending repeat deck assessment for disposition.     Diagnosis:    ICD-10-CM    1. Psychosis, unspecified psychosis type (H) F29        Disposition:  Signed out to pending repeat DEC assessment    Scribe Disclosure:  CAMI Angi Vito, am serving as a scribe on 12/25/2019 at 11:52 AM to personally document services performed by Kristin Camejo MD based on my observations and the provider's statements to me.      12/25/2019    EMERGENCY DEPARTMENT       Kristin Camejo MD  12/25/19 6424

## 2019-12-25 NOTE — ED PROVIDER NOTES
Taken in sign-out from Dr. Camejo. Seen by DEC, new onset psychosis. Admit.     Ancelmo Melissa MD  12/25/19 2659

## 2019-12-25 NOTE — ED NOTES
Pt asking many questions about the names on her try and defensive and guarded; family was told they need to wait to see her; they went for dinner

## 2019-12-25 NOTE — ED NOTES
Restraints were removed upon arrival.  Patient was naked.  Abrasions noted to bilat. Knees.  MD present.  Patient agreed to compliance with staff after restraint removal.  Patient then refused to get dressed, wanted underwear first.  Provided maternity knit panties and a pad (patient on period).

## 2019-12-25 NOTE — ED PROVIDER NOTES
"  History     Chief Complaint:  Psychiatric Evaluation      HPI   Fiona Mak is a 24 year old female with a history of *** who presents to the emergency department for evaluation of ***.      Allergies:  No known drug allergies     Medications:    Xanax  Benyoyl peroxide  Wellbutrin  Prozac  Flonase  Ritalin  Patanol  Prilosec  Acidophilus probiotic  Vyvanse    Problem List:    ***  There are no active problems to display for this patient.       Past Medical History:    ***  History reviewed. No pertinent past medical history.    Past Surgical History:    ***  History reviewed. No pertinent surgical history.    Family History:    ***  History reviewed. No pertinent family history.    Social History:  ***  Marital Status:  Single [1]  Social History     Tobacco Use     Smoking status: Never Smoker     Smokeless tobacco: Never Used     Tobacco comment: non smoking home   Substance Use Topics     Alcohol use: Not Currently     Drug use: Not Currently        Review of Systems  ***    Physical Exam   First Vitals:  BP: 121/76  Heart Rate: 105  Temp: 97.8  F (36.6  C)  Resp: 16  Height: 154.9 cm (5' 1\")  Weight: 77.1 kg (170 lb)  SpO2: 100 %      Physical Exam  ***    Emergency Department Course   ECG:  ***    Imaging:  {Radiographic findings?:092016595::\" \"}  ***    Laboratory:  ***    Procedures:  ***        Interventions:  ***  {Response to meds:012245::\" \"}    Emergency Department Course:  ***       Impression & Plan       {trauma activation?:183256::\" \"}  CMS Diagnoses: {Sepsis/Septic Shock/Stemi/Stroke:323558::\" \"}       Medical Decision Making:  ***  Critical Care time:  {none or minutes:443096::\"none\"}    Diagnosis:  No diagnosis found.    Disposition:  {discharged to home/discharged to home with.../Admitted to...:971326}    Discharge Medications:  New Prescriptions    No medications on file       Andrew Macias  12/25/2019    EMERGENCY DEPARTMENT    Scribe Disclosure:  Anderw ALMANZA Chi, am serving as a scribe at " 11:22 AM on 12/25/2019 to document services personally performed by Austin Kovacs MD based on my observations and the provider's statements to me.

## 2019-12-25 NOTE — ED NOTES
"Pt pacing throughout the common area, knocking on the windows and demanded she speak to her doctor or mental health advisor as she states, \" I haven't seen anyone here yet today.\"  "

## 2019-12-25 NOTE — PHARMACY-ADMISSION MEDICATION HISTORY
Pharmacy Medication History  Admission medication history interview status for the 12/25/2019  admission is complete. See EPIC admission navigator for prior to admission medications     Medication history sources: Patient  Medication history source reliability: Moderate  Adherence assessment: Poor    Significant changes made to the medication list:  Deleted all medications - no current prescriptions  Last fill history for pharmacies were from May of 2019 for alprazolam and Vyvanse    Additional medication history information:   none    Medication reconciliation completed by provider prior to medication history? No    Time spent in this activity: 10 minutes      Prior to Admission medications    Not on File     Beti Lui PharmD  Inpatient Clinical Pharmacist  [phone]

## 2019-12-26 ENCOUNTER — HOSPITAL ENCOUNTER (EMERGENCY)
Facility: CLINIC | Age: 24
Discharge: PSYCHIATRIC OR SUBSTANCE USE FACILITY | End: 2019-12-27
Attending: EMERGENCY MEDICINE | Admitting: EMERGENCY MEDICINE
Payer: MEDICAID

## 2019-12-26 VITALS
BODY MASS INDEX: 32.1 KG/M2 | SYSTOLIC BLOOD PRESSURE: 147 MMHG | RESPIRATION RATE: 16 BRPM | OXYGEN SATURATION: 99 % | HEART RATE: 101 BPM | HEIGHT: 61 IN | TEMPERATURE: 97.8 F | WEIGHT: 170 LBS | DIASTOLIC BLOOD PRESSURE: 78 MMHG

## 2019-12-26 VITALS
OXYGEN SATURATION: 97 % | DIASTOLIC BLOOD PRESSURE: 55 MMHG | HEART RATE: 95 BPM | TEMPERATURE: 98.6 F | RESPIRATION RATE: 17 BRPM | SYSTOLIC BLOOD PRESSURE: 95 MMHG

## 2019-12-26 DIAGNOSIS — F29 PSYCHOSIS, UNSPECIFIED PSYCHOSIS TYPE (H): ICD-10-CM

## 2019-12-26 DIAGNOSIS — F43.0 ACUTE REACTION TO SITUATIONAL STRESS: ICD-10-CM

## 2019-12-26 PROCEDURE — 99285 EMERGENCY DEPT VISIT HI MDM: CPT

## 2019-12-26 PROCEDURE — 25000132 ZZH RX MED GY IP 250 OP 250 PS 637: Performed by: EMERGENCY MEDICINE

## 2019-12-26 PROCEDURE — 80307 DRUG TEST PRSMV CHEM ANLYZR: CPT | Performed by: EMERGENCY MEDICINE

## 2019-12-26 RX ORDER — OLANZAPINE 10 MG/1
10 TABLET, ORALLY DISINTEGRATING ORAL ONCE
Status: COMPLETED | OUTPATIENT
Start: 2019-12-26 | End: 2019-12-26

## 2019-12-26 RX ORDER — WATER 10 ML/10ML
INJECTION INTRAMUSCULAR; INTRAVENOUS; SUBCUTANEOUS
Status: DISCONTINUED
Start: 2019-12-26 | End: 2019-12-26 | Stop reason: HOSPADM

## 2019-12-26 RX ORDER — OLANZAPINE 10 MG/2ML
INJECTION, POWDER, FOR SOLUTION INTRAMUSCULAR
Status: DISCONTINUED
Start: 2019-12-26 | End: 2019-12-26 | Stop reason: HOSPADM

## 2019-12-26 RX ORDER — OLANZAPINE 10 MG/2ML
10 INJECTION, POWDER, FOR SOLUTION INTRAMUSCULAR DAILY PRN
Status: DISCONTINUED | OUTPATIENT
Start: 2019-12-26 | End: 2019-12-27 | Stop reason: HOSPADM

## 2019-12-26 RX ADMIN — OLANZAPINE 10 MG: 10 TABLET, ORALLY DISINTEGRATING ORAL at 21:29

## 2019-12-26 ASSESSMENT — ENCOUNTER SYMPTOMS
MYALGIAS: 0
AGITATION: 1
ABDOMINAL PAIN: 0
ARTHRALGIAS: 0

## 2019-12-26 NOTE — ED NOTES
Pt given option to take ODT zyprexa but kept bringing to her mouth and saying she needed to think.  This RN stated she was going to get a IM injection instead, Rn asked for ODT pill back and pt nearly bit this RNs finger.

## 2019-12-26 NOTE — ED PROVIDER NOTES
Assumed care at change of shift this morning.  On first arrival to the emergency department there was concern about the patient suffering acute psychosis.  She was placed on the list for admission and on recheck this morning she seemed more clearheaded after discussion with my colleague Dr. Cowan.  She underwent another DEC evaluation and the  thought the patient could be discharged with family as she seemed clearheaded and not floridly psychotic at this time.  I assessed the patient myself and I agreed.  The patient currently is not suicidal homicidal I do not get a sense of severe psychotic features.  She will be discharged in the company of family who will keep a close eye on her and have her return if any concerns about rebounding psychosis or any other concerning psychological issues.     Tom Lester MD  12/26/19 9972

## 2019-12-26 NOTE — ED NOTES
"Pt rushed at the staff door and writer went into the commons area to see what pt wanted.  The pt had a panicked voice and pointed over writers shoulder out the window \"My brother is right there (true he is sitting in chairs)\" pt was wide eyed and pressured in conversation.  Pt wanted the brother to come back.  Writer went out to talk with brother.  He asked writer to drop off the coffee and he would be back.  Writer went into staff area and sitting at workstation the pt now rushed up to the staff window.  Writer asked what pt needed.  Pt stated \"I never was offered breakfast\"  Writer clarified that is was offered and has been clearly documented.  Pt given menu.  When writer gave pt a high lighter for safety, she stated \"This isn't a pen, I need a pen, oh forget it\" Writer sat down in commons area with pt and friend to clarify plan.  Pt began ruminating out statements interrupting writers attempt to clarify plan.  Pt stated \"Nobody has seen me today, I am on some kind of hold, (pt looking over her shoulders with a wall behind her) I have been given no diagnosis or told what is going on\"  Writer clarified that this AM around 7 that Dr. Cowan  Had spent approx 30 minutes with her in conversation.  Pt needs reinforcement   "

## 2019-12-26 NOTE — ED NOTES
"Pt agreed to vital signs and then when machine was brought to the room pt states, \" I changed my mind.  I won't do it.  I want water.  I am going to sleep.\"  "

## 2019-12-26 NOTE — ED NOTES
[facility] intake called and said they will not be taking BH1 until at least 8am due to shortage of nursing staff (-5).

## 2019-12-26 NOTE — ED NOTES
Pt offered food and drink and declined.  Emotional support given. Pt calm and cooperative at this time

## 2019-12-26 NOTE — ED NOTES
"Pt very fixated on the time.  Pt is up to the window frequently asking the time and \" wanting to see it.\"  "

## 2019-12-26 NOTE — ED NOTES
Pt states at this time she no longer wants visitors and asked that the chair be removed from her room.

## 2019-12-26 NOTE — ED NOTES
Pt given toiletries to freshen up.  Writer engaged pt in conversation.  Pt appears cautiously distracted looking over shoulder periodically as if looking for or at something behind her.  Pt states she wants to go home.  Writer clarified plan that pt will be reassessd by the therapist.  Writer offered pt hot breakfast and it was declined.  Pt updated that brother and friend sitting outside BH unit. Pt remains on an PARDEEP with staff and security watch

## 2019-12-27 ENCOUNTER — HOSPITAL ENCOUNTER (INPATIENT)
Facility: CLINIC | Age: 24
LOS: 4 days | Discharge: LEFT BEFORE DISCHARGE | End: 2019-12-31
Attending: PSYCHIATRY & NEUROLOGY | Admitting: PSYCHIATRY & NEUROLOGY
Payer: MEDICAID

## 2019-12-27 LAB
AMPHETAMINES UR QL SCN: NEGATIVE
BARBITURATES UR QL: NEGATIVE
BENZODIAZ UR QL: NEGATIVE
CANNABINOIDS UR QL SCN: POSITIVE
COCAINE UR QL: NEGATIVE
OPIATES UR QL SCN: NEGATIVE
PCP UR QL SCN: NEGATIVE

## 2019-12-27 PROCEDURE — H2032 ACTIVITY THERAPY, PER 15 MIN: HCPCS

## 2019-12-27 PROCEDURE — 12400001 ZZH R&B MH UMMC

## 2019-12-27 PROCEDURE — 99223 1ST HOSP IP/OBS HIGH 75: CPT | Mod: AI | Performed by: PSYCHIATRY & NEUROLOGY

## 2019-12-27 RX ORDER — OLANZAPINE 10 MG/1
10 TABLET ORAL
Status: DISCONTINUED | OUTPATIENT
Start: 2019-12-27 | End: 2019-12-31 | Stop reason: HOSPADM

## 2019-12-27 RX ORDER — OLANZAPINE 10 MG/2ML
10 INJECTION, POWDER, FOR SOLUTION INTRAMUSCULAR
Status: DISCONTINUED | OUTPATIENT
Start: 2019-12-27 | End: 2019-12-31 | Stop reason: HOSPADM

## 2019-12-27 RX ORDER — ACETAMINOPHEN 325 MG/1
650 TABLET ORAL EVERY 4 HOURS PRN
Status: DISCONTINUED | OUTPATIENT
Start: 2019-12-27 | End: 2019-12-31 | Stop reason: HOSPADM

## 2019-12-27 RX ORDER — TRAZODONE HYDROCHLORIDE 50 MG/1
50 TABLET, FILM COATED ORAL
Status: DISCONTINUED | OUTPATIENT
Start: 2019-12-27 | End: 2019-12-31 | Stop reason: HOSPADM

## 2019-12-27 RX ORDER — HYDROXYZINE HYDROCHLORIDE 25 MG/1
25 TABLET, FILM COATED ORAL EVERY 4 HOURS PRN
Status: DISCONTINUED | OUTPATIENT
Start: 2019-12-27 | End: 2019-12-31 | Stop reason: HOSPADM

## 2019-12-27 RX ORDER — ALUMINA, MAGNESIA, AND SIMETHICONE 2400; 2400; 240 MG/30ML; MG/30ML; MG/30ML
30 SUSPENSION ORAL EVERY 4 HOURS PRN
Status: DISCONTINUED | OUTPATIENT
Start: 2019-12-27 | End: 2019-12-31 | Stop reason: HOSPADM

## 2019-12-27 RX ORDER — BISACODYL 10 MG
10 SUPPOSITORY, RECTAL RECTAL DAILY PRN
Status: DISCONTINUED | OUTPATIENT
Start: 2019-12-27 | End: 2019-12-31 | Stop reason: HOSPADM

## 2019-12-27 ASSESSMENT — ACTIVITIES OF DAILY LIVING (ADL)
TOILETING: 0-->INDEPENDENT
AMBULATION: 0-->INDEPENDENT
BATHING: 0-->INDEPENDENT
RETIRED_EATING: 0-->INDEPENDENT
FALL_HISTORY_WITHIN_LAST_SIX_MONTHS: NO
WHICH_OF_THE_ABOVE_FUNCTIONAL_RISKS_HAD_A_RECENT_ONSET_OR_CHANGE?: COMMUNICATION/SPEECH
LAUNDRY: UNABLE TO COMPLETE
ORAL_HYGIENE: INDEPENDENT
RETIRED_COMMUNICATION: 0-->UNDERSTANDS/COMMUNICATES WITHOUT DIFFICULTY
HYGIENE/GROOMING: HANDWASHING;INDEPENDENT
DRESS: INDEPENDENT
HYGIENE/GROOMING: INDEPENDENT
SWALLOWING: 0-->SWALLOWS FOODS/LIQUIDS WITHOUT DIFFICULTY
ORAL_HYGIENE: INDEPENDENT
COGNITION: 2 - DIFFICULTY WITH ORGANIZING THOUGHTS
TRANSFERRING: 0-->INDEPENDENT
DRESS: INDEPENDENT
DRESS: 0-->INDEPENDENT

## 2019-12-27 ASSESSMENT — MIFFLIN-ST. JEOR: SCORE: 1448.51

## 2019-12-27 NOTE — ED NOTES
ER MD at bedside: 5 point restraints removed off of patient- garage door in room closed for safety. Patient resting comfortably on bed.

## 2019-12-27 NOTE — PROGRESS NOTES
PATIENT BELONGINGS:     Items in Patient Locker:  lanyard, chargers & adapter, 4 beaded bracelets, glass vial of scented oil, sweat pants with drawstrings, tarot cards, smart phone, key and necklace, sandals and bracelets, socks, coat, wallet with [place] ID    Items sent to Security:  -[employer] [place] (Visa, 8539, 6586, 8539)  -[facility] (Visa, 9776)  -Venmo debit (, 3421)  -Check for ($1.33)  -[place] [employer] (Visa, 8785)  -[employer] Plasma Card (, 2232)  -[facility] (Visa, 9776)  -[employer] Debit (Visa, 9149)  -[employer] (, 3369)  -Simple debit (Visa, 8600)  -[employer] (Visa, 2636)  -[place] bank debit (, 5557)    ---No Cash, No Credit/Debit, and No Medications (other than noted above) at the time of admission.    A               Admission:  I am responsible for any personal items that are not sent to the safe or pharmacy.  [facility] is not responsible for loss, theft or damage of any property in my possession.    Signature:  _________________________________ Date: _______  Time: _____                                              Staff Signature:  ____________________________ Date: ________  Time: _____      2nd Staff person, if patient is unable/unwilling to sign:    Signature: ________________________________ Date: ________  Time: _____     Discharge:  [facility] has returned all of my personal belongings:    Signature: _________________________________ Date: ________  Time: _____                                          Staff Signature:  ____________________________ Date: ________  Time: _____

## 2019-12-27 NOTE — ED NOTES
"Pt speaking in foreign language; registration staff stated that pt is speaking Creole and is saying in Creole \"I did something I wasn't supposed to and now I'm afraid to talk\". RN notified.  "

## 2019-12-27 NOTE — PROGRESS NOTES
"Attended a total of 1 of 3 music therapy groups. Pt was irritable and appeared anxious. When entering afternoon group, she immediately stated that she did not like the music playing and asked for it to be turned down. She demanded that the group talk about something \"to help me get out of here.\" Writer and pt continued discussion about self-care that group had been having. She was calm and attentive during this. At end of group, she refused to give a marker back to a peer, stating \"this isn't yours. This is the unit's.\" She also initially refused to leave the group room, as this peer (MIKE) and a therapist were going to check in between groups in the group room. She needed much encouragement in order to leave.     Pt attended first half of name that tune. When entering group, she stated \"I thought this was going to be therapy and we were going to talk about stuff.\" She did participate in the game with encouragement, but was labile during the game. When she heard a song that she liked, she demanded that it be left on. When she heard a song she didn't like, she demanded that it be turned off. She left after 30 minutes to get items from staff, and did not return.   "

## 2019-12-27 NOTE — PHARMACY-ADMISSION MEDICATION HISTORY
Admission medication history interview status for the 12/27/2019 admission is complete. See Epic admission navigator for allergy information, pharmacy, prior to admission medications and immunization status.     Medication history interview sources:  SureScripts, patient    Changes made to PTA medication list (reason)  Added: None  Deleted: None  Changed: None    Additional medication history information (including reliability of information, actions taken by pharmacist):  -Patient not currently taking any medications.    Prior to Admission medications    Not on File     Medication history completed by:   Kristen Sierra, PharmD  PGY-1 Pharmacy Resident

## 2019-12-27 NOTE — PLAN OF CARE
BEHAVIORAL TEAM DISCUSSION    Participants: Saturnino Johnson MD, Nany RN and Brynn STRANGE  Progress: New admission  Anticipated length of stay: 7 days  Continued Stay Criteria/Rationale: Evaluation, assessment and stabilization  Medical/Physical: Will evaluate and assess  Precautions:   Behavioral Orders   Procedures    Assault precautions    Code 1 - Restrict to Unit    Routine Programming     As clinically indicated    Sexual precautions    Single Room    Status 15     Every 15 minutes.     Plan: Evaluate assess and stabilize  Rationale for change in precautions or plan: N/A

## 2019-12-27 NOTE — PROGRESS NOTES
"Pt has been isolative to her room; appears withdrawn. Pt is tense and irritable, questions staff instructions and unit rules. Pt is disheveled. Does not attend groups. States \"I want to nap\".  Pt refused visits from family this shift.   Pt was given her phone upon request to get her aunt's number. She began to text and scroll through Internet. Writer reminded pt 3x to only get numbers, this upset her but she was able to get aunts number and return phone.  When asked about mental health symptoms, pt denied all symptoms and stated \"who are you anyway's. I'm fine. Are you recording this?\"  This writer did let pt know that information will be charted. Pt then instructed this writer to leave her room.       12/27/19 1336   Behavioral Health   Hallucinations   ([facility])   Thinking paranoid;poor concentration   Orientation person: oriented;place: oriented   Memory   ([facility]I)   Insight poor   Judgement impaired   Eye Contact at examiner   Affect blunted, flat;tense   Mood irritable   Physical Appearance/Attire disheveled   Hygiene neglected grooming - unclean body, hair, teeth;body odor   Suicidality   (RUCHI)   Self Injury   (RUCHI)   Elopement   (none observed)   Activity isolative;withdrawn   Speech coherent   Medication Sensitivity no observed side effects   Psychomotor / Gait balanced;steady   Activities of Daily Living   Hygiene/Grooming independent   Oral Hygiene independent   Dress independent   Room Organization independent     "

## 2019-12-27 NOTE — ED PROVIDER NOTES
"  History     Chief Complaint:  Psychiatric Evaluation and Aggressive Behavior    The history is provided by the patient.      Fiona Mak is a 24 year old female who presents with aggressive behavior.     Patient was seen yesterday here for psychosis but upon recheck in the morning, patient was cleared by DEC and MD and discharged home (work-up is detailed below). Upon arriving at home, Fiona states she had a bad nightmare and woke up \"upset,\" prompting her family to bring her to the ER. Upon arrival, patient was thrashing and screaming in triage. Notably, patient attempted to throw herself off the stretcher during room placement but was caught by staff prior to falling on the floor. Patient reports smoking marijuana today. She denies any pain, abdominal pain, suicidal and homicidal ideation, or cocaine and methamphetamine use.     Work-up on 12/25/19:  CT Head without contrast:   No acute intracranial abnormality, as per radiology.      Laboratory:  CBC: WBC: 11.2 (H), HGB: 13.0, PLT: 322  CMP: Glucose 100 (H), o/w WNL (Creatinine: 0.71)  Acetaminophen level: <2  Salicylate level: <2     UA with Microscopic: Ketones: 10 (A), Blood: Large (A), Albumin: 100 (A), Leukocyte esterase: Moderate (A), WBC: 24 (H), RBC: >182 (H), Squamous epithelial: 18 (H), o/w WNL   Drug abuse screen urine: Cannabinoids: Positive (A), o/w WNL     ISTAT HCG: <5.0    Allergies:  No Known Drug Allergies    Medications:    Vyvanse  Xanax    Past Medical History:    Anxiety     Past Surgical History:    Surgical history reviewed. No pertinent surgical history.    Family History:    Family history reviewed. No pertinent family history.     Social History:  Never Smoker  Drug use: marijuana  Alcohol Use: No  Marital Status: Single      Review of Systems   Cardiovascular: Negative for chest pain.   Gastrointestinal: Negative for abdominal pain.   Musculoskeletal: Negative for arthralgias and myalgias.   Psychiatric/Behavioral: Positive for " agitation and behavioral problems.   All other systems reviewed and are negative.    Physical Exam   Patient Vitals for the past 24 hrs:   BP Temp Temp src Pulse Resp SpO2   12/26/19 2214 -- -- -- -- -- 97 %   12/26/19 2200 95/55 -- -- 95 -- 96 %   12/26/19 2146 -- -- -- -- -- 97 %   12/26/19 2143 118/70 -- -- 90 -- 98 %   12/26/19 2133 -- 98.6  F (37  C) Temporal -- -- --   12/26/19 2122 128/72 -- -- 118 17 97 %     Physical Exam  Eye:  Pupils are equal, round, and reactive.  Extraocular movements intact.    ENT:  No rhinorrhea.  Moist mucus membranes.  Normal tongue and tonsil.    Cardiac:  Regular rate and rhythm.  No murmurs, gallops, or rubs.    Pulmonary:  Clear to auscultation bilaterally.  No wheezes, rales, or rhonchi.    Abdomen:  Positive bowel sounds.  Abdomen is soft and non-distended, without focal tenderness.    Musculoskeletal:  Normal movement of all extremities without evidence for deficit.    Skin:  Warm and dry without rashes.    Neurologic:  Non-focal exam without asymmetric weakness or numbness.     Psychiatric:  Patient is awake and alert. Poor historian with severe emotional lability.     Emergency Department Course     Laboratory:  UDS: cannabinoids positive (A) o/w negative    Interventions:  2129: Zyprexa-ODT 10 mg PO    Emergency Department Course:  Nursing notes and vitals reviewed.  2122 Code 21. Patient attempted to throw herself off the stretcher. Patient transferred from room 18 to [facility] in 5-point restraints.   2216 I performed an exam of the patient as documented above. Patient removed from 5 point restraints.   2223 Spoke with family regarding patient presentation.  2332 Spoke with DEC regarding patient presentation and plan of care.   0058 Patient accepted at [place]  0100 Patient signed out in stable condition.     Impression & Plan      Medical Decision Making:  Fiona Mak is a 24 year old female who presents to the emergency department today for evaluation of aggressive  behavior, psychosis, all likely related to situational stress.  I invite the reader to review her visit to this emergency department yesterday where she was seen by my colleagues.  She underwent a very thorough medical evaluation including lab work and head CT.  Her studies were all unremarkable.  While she was having similar aggressive behaviors, she called throughout the night and on reassessment, it was felt that she was safe to be discharged.  She went home in the care of her older brother.    I spoke at length with her older brother who notes that the patient had significant emotional lability throughout the day.  She seems to be perseverating on her broken relationship with her mother, trying to figure out how to win her affection.  As the evening went on, the patient became more and more agitated and eventually returned to similar behaviors that brought her to the ER yesterday, including calling out, removing her clothing, and running about the halls of the [place] apartment building.  With this, the brother was able to coax her into his car and brought her here for assessment.    A code 21 was called when the patient was being brought back to her room.  I was not present for this spell, but report is that she tried to leap off of the cart that was taking her back into the room.  She required a show of force and several security guards to keep her from exiting the premises.  She agreed to take an oral dose of Zyprexa and she was placed in five-point restraints due to harm to herself and others.  When I was able to assess the patient, she was now calm and cooperative and restraints were immediately removed.  She is rather guarded in her history.  She is unwilling to provide more information as to why she is so upset, though she did speak with my therapist and did admit to severe stress regarding her mother.  She denies homicidal or suicidal thoughts.    I do not feel she requires a repeat medical evaluation  considering her normal tests yesterday.  She clearly requires admission to the hospital.  She was placed on a 72-hour hold.  A bed was procured for her at [facility].  She will be transferred there once the bed becomes available.  Family's questions were answered and they are comfortable with the plan for admission.    Diagnosis:    ICD-10-CM   1. Psychosis, unspecified psychosis type (H) F29   2. Acute reaction to situational stress F43.0     Disposition: Signed out in stable condition, awaiting bed placement at [place].     Scribe Disclosure:  I, Houston Wills, am serving as a scribe at 9:43 PM on 12/26/2019 to document services personally performed by Trierweiler, Chad A, MD based on my observations and the provider's statements to me.     EMERGENCY DEPARTMENT       Trierweiler, Chad A, MD  12/27/19 1131

## 2019-12-27 NOTE — ED TRIAGE NOTES
Patient discharged from here this morning. Patient brought in this evening by family with concerns for her mental health. Patient screaming at family, ER Staff and thrashing about in triage. Patient uncooperative- family also uncooperative. Code 21 called in hallway and patient attempting to throw herself off stretcher. Patient out of control, at risk for harming herself and others. Patient placed in 5 point restraints immediatly upon entering room 18.

## 2019-12-27 NOTE — PROGRESS NOTES
Initial Psychosocial Assessment    I have reviewed the chart, met with the patient, and developed Care Plan.  Information for assessment was obtained from: Patient and Medical Chart    Presenting Problem: Admitted on a 72 hour hold to [facility] on 12/27/19 due to psychotic symptomology. Patient presents as irritable and minimally cooperative with assessment    History of Mental Health and Chemical Dependency:  No hospitalization.  No mental health history.  No OP providers.  o medications reported.  Utox POS for cannibis    Family Description ([place], Family Psychiatric History):  Raised by mom.  Has a step dad.  She does not know about her bio dad.  She does not want to anser    Significant Life Events (Illness, Abuse, Trauma, Death):  Family emigrated from [facility]    Living Situation:  Currently lives in the hospital - no permanent address at present reported    Educational Background:  She is a college graduate    Occupational History:  She is employed and working for [employer].  She reports she probably lost her job due to being hospitalized    Financial Status:  Income: Employment  Insurance:  No Insurance, it appears    Legal Issues:  72 Hour Hold Begin Date: 12/27/2019    72 Hour Hold Begin Time: 1:00 AM    72 Hour Hold End Date: 1/2/2020    72 Hour Hold Time End: 1:00 AM      Ethnic/Cultural Issues:  24 year old Black or  female    Spiritual Orientation:  I keep stuff like that to myself     Service History:  None    Social Functioning (organization, interests):  Enjoys readin    Current Treatment Providers are:  PCP: [facility]  [phone]    Social Service Assessment/Plan:  Patient will have psychiatric assessment and medication management by the psychiatrist. Medications will be reviewed and adjusted per MD as indicated. The treatment team will continue to assess and stabilize the patient's mental health symptoms with the use of  medications and therapeutic programming. Hospital staff will provide a safe environment and a therapeutic milieu. Staff will continue to assess patient as needed. Patient will participate in unit groups and activities. Patient will receive individual and group support on the unit.     CTC will do individual inpatient treatment planning and after care planning. CTC will discuss options for increasing community supports with the patient. CTC will coordinate with outpatient providers and will place referrals to ensure appropriate follow up care is in place.

## 2019-12-27 NOTE — PROGRESS NOTES
DISCHARGE PLANNING NOTE    Diagnosis/Procedure:   Patient Active Problem List   Diagnosis     Psychosis (H)          Barrier to discharge: Sx Stabilization    Today's Plan: Completed initial psychosocial assessment, completed team note, started AVS.  Met with patient face to face.  Attempted to complete Personal Plan of Care - patient declined. Spoke with UR - they are aware that patient does not have insurance at this time    Discharge plan or goal: Dispo plan TBD.  Stabilize.  Expected stay 7 days    Care Rounds Attendance:   ALIE LEDEZMA MD

## 2019-12-28 PROCEDURE — 12400001 ZZH R&B MH UMMC

## 2019-12-28 ASSESSMENT — ACTIVITIES OF DAILY LIVING (ADL)
HYGIENE/GROOMING: SHOWER;INDEPENDENT
ORAL_HYGIENE: INDEPENDENT
LAUNDRY: UNABLE TO COMPLETE
DRESS: INDEPENDENT

## 2019-12-28 NOTE — PROGRESS NOTES
"   19 1744   Values Beliefs and Spiritual Care   C: Community: In support of your spiritual health, is there someone we may contact for you? (identify all that apply)   ()   Visit Information   Visit Made By Staff    Type of Visit Initial;On-call;Staff consultation/triage   Visited Patient   Interventions   Plan of Care Review   With patient/family/proxy   Basic Spiritual Interventions    introduction/orientation to Spiritual Health Services;Assessment of spiritual needs/resources;Reflective conversation   Advanced Assessments/Interventions   Presenting Concerns/Issues Spiritual/Yarsani/emotional support;Grief and adjustment issues;Family dynamics;Challenged coping   SPIRITUAL HEALTH SERVICES  [facility]  ON-CALL VISIT     REFERRAL SOURCE: Epic consult.     Met with Fiona in the interview room. She shared that her slightly younger cousin very recently .  The cause is not entirely clear to her.  \"How do you process loss?\", she asked.  We spoke about acknowledging and turning towards the feelings of loss.  I mentioned that grief is a universal experience that no one can avoid, and that there are many different emotions that masquerade as grief.  She said that she has conflict with her family and that she feels a lot of pressure to do well for the collective. She mentioned being in college.  She said that she is resilient and that she tries to work with positive thoughts; \"that sometimes brings me peace.\". \"I have a lot of work to do when I get out of here; that makes me nervous.\" \"My family is very diverse: we have Muslims and Christians and traditional practices.\" Fiona said that she doesn't like to feel enclosed, so she wanted the door left open while we met.     PLAN: Will let unit  know of this visit.       Flo Perez  Staff   Spiritual Health Services  Pgr: [phone]    * [facility] remains available  for emergent requests/referrals, " either by having the switchboard page the on-call  or by entering an ASAP/STAT consult in Epic (this will also page the on-call ).*

## 2019-12-28 NOTE — PROGRESS NOTES
12/28/19 1500   Behavioral Health   Hallucinations denies / not responding to hallucinations   Thinking poor concentration   Orientation person: oriented;place: oriented;date: oriented   Insight poor   Judgement impaired   Eye Contact at examiner   Affect blunted, flat   Mood mood is calm;irritable   Physical Appearance/Attire attire appropriate to age and situation   Hygiene neglected grooming - unclean body, hair, teeth;body odor   1. Wish to be Dead (Recent) No   2. Non-Specific Active Suicidal Thoughts (Recent) No   Activity isolative   Speech coherent   Medication Sensitivity no observed side effects   Psychomotor / Gait balanced   Activities of Daily Living   Hygiene/Grooming shower;independent   Oral Hygiene independent   Dress independent   Laundry unable to complete   Room Organization independent       Pt has been isolative all day. Pt ate all her meals and remained in her room the entire shift. She took a shower and asked for a  referral. Pt denies any SI/SIB HI. No stated side effects. Her only concerns are about discharge.

## 2019-12-28 NOTE — PROGRESS NOTES
I was called for Fever. Per RN, no other symptoms  Per chart review, she had elevated white count  Will obtain UA, and CBC  Advised to page if clinical situation changes.     Ubaldo Aguilera  Internal Medicine  Hospitalist  Pager no: [phone]

## 2019-12-28 NOTE — PROGRESS NOTES
"Patient has been visible in milieu, interacting with select peers and watching television. She struggled to remember when she was brought to the unit today or not. She is location oriented. She had the insight to understand she needs to cooperate with staff to discharge. She hopes to call her outpatient provider tomorrow to try to discharge. No behavior incidents, but had some odd interactions with patient. When offered her menu for tomorrow, she accepted today's menu but wouldn't accept it saying \"just hold onto it for me.\" Writer explained that nutrition services needed it in the next few hours and she said \"so you expect all patients to hold onto their menus?\" She ate some dinner. She did mention that the only meat she eats is fish. Denies SI/SIB      12/27/19 2300   Behavioral Health   Hallucinations denies / not responding to hallucinations   Thinking poor concentration   Orientation person: oriented;place: oriented;date, disoriented   Insight poor   Judgement impaired   Eye Contact at examiner   Affect blunted, flat   Mood mood is calm;irritable   Physical Appearance/Attire untidy;attire appropriate to age and situation   Hygiene other (see comment)  (acceptable)   Suicidality other (see comments)  (denies)   1. Wish to be Dead (Recent) No   2. Non-Specific Active Suicidal Thoughts (Recent) No   Self Injury other (see comment)  (denies)   Elopement Statements about wanting to leave   Activity isolative   Speech clear   Medication Sensitivity no stated side effects;no observed side effects   Psychomotor / Gait balanced;steady   Activities of Daily Living   Hygiene/Grooming handwashing;independent   Oral Hygiene independent   Dress independent   Laundry unable to complete   Room Organization independent     "

## 2019-12-28 NOTE — PROGRESS NOTES
Spoke with pt about physicians orders for CBC and urine sample due to elevated temperature. Pt reports that she does not wish to be poked again or give a urine sample. She reports that if she does not feel well tomorrow she will let us know. IM was paged and alerted to pt request. Will continue to monitor.

## 2019-12-28 NOTE — H&P
"Admitted:     12/27/2019      The patient was seen for 70 minutes on 12/27/2019.  Greater than 50% of this time was spent on counseling and coordinating care, clarifying diagnostic and prognostic issues, discussing patient's symptoms and suggested post-discharge followup.  I also spent a significant amount of time trying to convince the patient to let me talk to some of her family members.      CHIEF COMPLAINT AND REASON FOR ADMISSION:  The patient is a 24-year-old female who was admitted on a 72-hour hold due to concern of out-of-control, agitated and bizarre behavior.      HISTORY OF PRESENT ILLNESS:  The patient was only partially cooperative during the interview and was quite irritable, sarcastic and guarded, and information received from her was limited, and she refused to let me talk to her mother, stating that her mother was the main reason for why patient was hospitalized; \"I want to keep her out of it.\"  According to the patient's own explanation, she was admitted because she had a fight with her mother and \"I lost my shirt.  Can I go home?\"  All attempts to clarify why mother was concerned and brought the patient back were unsuccessful.  The patient continued to maintain that there was nothing wrong, that she was doing fine and wanted to be discharged.  She denied suicidal or homicidal ideation, denied auditory or visual hallucinations.  When asked if she used any substances, she said that she was \"smoking pot occasionally with my friends,\" who also drink occasionally.  She did not believe that she could have any mental illness.  Denied using any illicit substances (only reluctantly acknowledged smoking pot after I told her that on 2 occasions she tested positively for it.)  She reported that she has not been sleeping well for the last few days prior to admission and was doing some projects.  When asked to clarify how many hours she was sleeping, she said 5-6.  When asked about projects, said that she " "likes to read and refused to elaborate what projects she meant.  Of note, she denied feeling depressed.  When I explained to her symptoms of bipolar, she said that she did not believe that she was bipolar.  However, she admitted to the DEC  that she was feeling sad.  According to collateral sources, the patient was seen at [facility] 2 times.  First time on 12/25, the patient's mother came home from work at an overnight shift and found patient \"acting strange and tearing her clothes off.\"  Mother called 911.  The patient never acted like this before.  On the police department arrival, they reported the patient was fine but then suddenly \"flipped\" and tore her clothes off and started running away.  Paramedics found the patient completely naked and unwilling to answer any questions, staring straight ahead.  In the emergency room, the patient reported that she got into a \"tiff\" with her mother and that mother was going through a divorce, but would not elaborate further.  The patient's mother noted that patient was staring upwards and seemed to be responding to internal stimuli at home.  Police found an empty marijuana pipe in her car today.      PAST PSYCHIATRIC HISTORY:  The patient did not have any mental health symptoms.  Did said that she had some counseling in college \"because of my ADHD.\"  She said that she has never been medicated, never been admitted to mental health institution, never attempted suicide or did any self-harm.  In her medical records, she was noted to have a diagnosis of anxiety and depression.  Per the DEC , the patient reported that she had a history of difficult relationship with her mother, trying to reach out for her mother, said that conversation went downhill to the point where her mom was grabbing, scratching at her, pulling her blanket and clothes off.  Again, according to the DEC 's note, the patient appeared to be quite appropriate, alert and oriented x " "5.  They requested to be discharged from the emergency room and were discharged; however, mother brought her back after only a short time.  For more details, please see emergency room and the DEC 's note.      PAST MEDICAL HISTORY:  The patient denied any significant health problems.  Denied taking any home medications.      ALLERGIES:  DENIED ANY KNOWN MEDICAL ALLERGIES.      For physical examination and 12-point review of systems, please refer to Dr. Trier Weiler's note from 12/26/2019.  I reviewed this note and agree with it.      VITAL SIGNS:  Temperature 99.6, respirations 16, heart rate 94, blood pressure 125/87      FAMILY AND SOCIAL HISTORY:  The patient said that she was born in [facility], but her parents are from [place].  She refused to say if her parents were refugees from civil war, \"why does it matter?\"  She said that she has 1 sibling.  When asked if sibling was full blood or half, she again asked me why does it matter and said that her father was in [place], and she has 3 or 4 siblings there, said that as far she knows she does not have any family history of mental illness.  She appears to be guarded, sarcastic and very irritable.  I confirmed that she is a college graduate and works at [employer].  A number of times she asked me how I knew that information, and I had to reply again and again that the information came from her chart.      MENTAL STATUS EXAMINATION:  Overweight female dressed in hospital garb, was sleeping in her bed, woke up, was sitting on the bed while talking to me, pretty irritable in her attitude and gave short sarcastic answers.  Speech at times was fast, and I had to ask her a number of times to repeat what she was saying, but not pressured.  Denied suicidal or homicidal ideation, auditory or visual hallucinations, delusions.  Said that her mood was \"Fine.  Can I go home?\"  Affect labile and irritable.  It was difficult to evaluate her thought processes " because of her limited cooperation; however, at least superficially, she gave logical answers, and I did not see presence of loose associations or flight of ideas.  She did not appear to be responding to internal stimuli either.  The patient's ability to focus and concentrate on conversation was questionable, again because of her limited cooperation.  Insight and judgment were poor.  Motor:  Stance and gait were not checked.  Posture was within normal limits.  She did not exhibit any visible abnormal involuntary movements.      IMPRESSION:     1.  Substance-induced psychosis?   2.  Rule out bipolar affective disorder, steve.   3.  Rule out schizophrenia spectrum disorder.   4.  Rule out cannabis use disorder.      TREATMENT PLAN:  The patient presents as guarded and likely minimizes her symptoms, trying to get out-of-hospital as soon as possible, but she refuses to let us communicate with her mother.  I felt it appropriate to keep her in the hospital for at least over the weekend and to evaluate her behavior more.  We will use p.r.n. Zyprexa for severe agitation.  We will continue a 72-hour hold.         JAMARCUS STEWART MD             D: 2019   T: 2019   MT: SARA      Name:     EVE HUBBARD   MRN:      [id number]        Account:      [id number]   :      1995        Admitted:     2019                   Document: [id number]       cc: [facility]

## 2019-12-28 NOTE — PROGRESS NOTES
Advised by psych associate that pt temperature was 101.6. Went and spoke with her regarding temperature. She reports that she feels fine, denies body aches, chills,  and she just wants to leave and attend an OP program. Offered pt PRN Tylenol but pt refused. Paged internal med for further orders regarding temp.

## 2019-12-29 PROCEDURE — 99207 ZZC CDG-MDM COMPONENT: MEETS LOW - DOWN CODED: CPT | Performed by: PHYSICIAN ASSISTANT

## 2019-12-29 PROCEDURE — 99232 SBSQ HOSP IP/OBS MODERATE 35: CPT | Performed by: PHYSICIAN ASSISTANT

## 2019-12-29 PROCEDURE — 12400001 ZZH R&B MH UMMC

## 2019-12-29 ASSESSMENT — ACTIVITIES OF DAILY LIVING (ADL)
ORAL_HYGIENE: INDEPENDENT
LAUNDRY: UNABLE TO COMPLETE
DRESS: INDEPENDENT
HYGIENE/GROOMING: INDEPENDENT
DRESS: INDEPENDENT
HYGIENE/GROOMING: INDEPENDENT
ORAL_HYGIENE: INDEPENDENT

## 2019-12-29 NOTE — PROGRESS NOTES
patient retired early to her room. No issues reported nor observed, with patient appearing bright and social with peers and staff throughout the shift (until sleep at roughly 2000).

## 2019-12-29 NOTE — PROGRESS NOTES
"Patient NOC reported slept 0.75 hours last night. Patient agitated and difficult to redirect.    Patient refused morning vitals. IM came to see r/t 100.8 temp yesterday at 1610 and am lab refusal. Patient allowed IM to take VS. Patient requested \"different nurse-different-team-different provider\" and was assured that Monday morning team review would address her concerns.    ON-CALL updated by IM and nursing.      1200:/82   Pulse 101   Temp 98.5  F (36.9  C) (Oral)   Resp 16   Ht 1.575 m (5' 2\")   Wt 74.5 kg (164 lb 4.8 oz)   LMP 12/25/2019   SpO2 99%   BMI 30.05 kg/m  . IM present.    PHONE RESTRICTION: Pateint care order NO CELL PHONE until Monday after team review (patient texting and refusal to comply with unite rules).    Patient behavior is increasingly labile including tears. Patient at this time very focused on again obtaining access to phone.    Patient VISITOR (sister) was asked, by patient, to come, bring a book and food-upon her arrival (1345) patient asked \"wait until Aunt gets here-. Reviewed visiting hours with patient multiple times as sister continues to call-patient continues to say \"not yet.\"     1430: Visiting hours have ended and staff went to offer to leave items with staff-sister not in lobby?    Nursing will continue to monitor.  "

## 2019-12-29 NOTE — PROGRESS NOTES
Psych associate attempted to recheck pt vital signs, but patient refused. Writer went into room and was stopped at the door, pt was asked if they could re-check her temperature due to it being elevated earlier in the shift. She reports that she refuses, that she feels much better, and she will allow us to recheck them in the AM.

## 2019-12-29 NOTE — PROGRESS NOTES
Addendum:  Patient was again seen in the afternoon for repeat assessment and encouragement follow-up testing for tachycardia and fever.  Per patient's request she was seen in the patient living room space.  Nursing was able to take patient's vitals which showed significant improvement in tachycardia with heart rate 101 and temp 98F  Discussed again with patient potential for infection including risk of sepsis and discussed recommended work-up.  Patient continues to refuse any further examination or work-up at this time.  She says she may be open to assessment tomorrow.      Internal Medicine Follow Up Note     Patient: Fiona Mak  MRN: [id number]  Admission Date: 12/27/2019  Hospital Day # 2    Assessment & Recommendations: Fiona Mak is a 24 year old woman with a history of anxiety and aggitation who is admitted to station 32, admitted for aggressive behavior. Medicine seeing today for ever and tachycardia.     Fever (101F)  Tachycardia (150s)  Patient refused examination other than cardiac. Discussed with her that this may represent a serious concern and we cannot assess without further testing.  Patient continues to be her own medical decision maker per psychiatry and has prn anxiolytic medications and is refusing to take any medications.  She is not on any daily or scheduled medications.  She refused vitals assessment per nursing and has been refusing cares per nursing. Labs on 12/25/19 show WBC 11.2, otherwise normal cbc, and CMP. BG 12/25 100. UA was + lg blood, mod LE, 24 WBC, 18 Squams, 100 albumin.  Drug of abuse screen 12/26 + canabidiods, otherwise neg. CT head 12/25/19 negative for acute intracranial pathology. Prior TSH 8/2015, 6/2017 and 8/2018 all normal. HIV testing 5/10/18 and 12/18.17 nonreactive. Negative STI testing in 2017. Vit D was low in 2017 and 2015. Pt admits weakness, denies chest pain, SOB, palpiations, cough, dizziness, HA, urinary or bowel complaints. Exam + tachycardia, other  exam refused per patient.   -d/w psychiatry and they are aware of patient's refusal and will continue to offer cares and encourage work-up  Plan to return and discuss with patient   Recommended workup:  -EKG  -CXR  -blood cultures x2 at 2 different sites  -CBC with diff  -CMP  -UA with reflex  -respiratory virus panel  -Influenza A/B screen  -lactate, procalcitonin      Medicine will be happy to see patient if she is agreeable or if psychiatry notes patient is unable to refuse, please page with any additional concerns.     Paris Sterling PA-C  Hospitalist Service  Pager: [phone]  _________________________________________________________________    Subjective & Interval History:  Chief complaint of fever    Patient denies noticing a fever or elevated heart rates.  She admits feeling tired and is asking if she can go to sleep.  She attributes her feeling tired to lack of sleep and denies suspicion that there may be a another source of her lethargy.  She denies any chest pain, shortness of breath, palpitations.  She denies any upper respiratory symptoms including any cough, sneezing, rhinorrhea, sore throat, dysphagia.  She denies any new urinary or bowel complaints.  She denies any new rashes or skin changes.  She denies any changes to weight or appetite.  She does not endorse having any chills or sweats.    Unfortunately her history taking is limited due to patient participation.  Of note patient was initially agreeable to vital signs performed by provider and refused vitals with nursing.  She has been refusing cares per nursing and has not taken any medications.  All of her medications are ordered PRN at this time.  Laboratories from 12/25/2019 were not consistent with an active infection.    Patient states that she does not believe that she has an illness or cold and that she would rather follow-up with her primary care provider.  She is adamantly refusing any work-up including laboratory, EKG and chest x-ray  "testing as well as urinary testing.    Of note she was admitted with agitation and slept for approximately 45 minutes overnight.    Last 24 hour care team notes reviewed.   ROS: 4 point ROS (including Respiratory, CV, GI and ) was performed and negative unless otherwise noted in HPI.     Medications: Reviewed in EPIC.    Physical Exam:    Blood pressure (!) 150/92, pulse 85, temperature 100.8  F (38.2  C), temperature source Tympanic, resp. rate 16, height 1.575 m (5' 2\"), weight 74.5 kg (164 lb 4.8 oz), last menstrual period 12/25/2019, SpO2 99 %, not currently breastfeeding.     VS 0900 12/29/19: bp 136/98, p 150, spo2 99%, T 98.2, RR18  GENERAL: Alert and oriented x 3. Ambulatory, appears anxious  HEENT: NC AT  CV: tachycardic to 150, RRR. S1, S2. No m/r/g  RESPIRATORY: patient refused  GI: patient refused  NEUROLOGICAL: patient refused  EXTREMITIES: patient refused   SKIN: patient refused      Labs & Studies of Note: I personally reviewed the following studies:  CBC RESULTS:   Recent Labs   Lab Test 12/25/19  1157   WBC 11.2*   RBC 4.44   HGB 13.0   HCT 38.7   MCV 87   MCH 29.3   MCHC 33.6   RDW 13.2        Last Comprehensive Metabolic Panel:  Sodium   Date Value Ref Range Status   12/25/2019 137 133 - 144 mmol/L Final     Potassium   Date Value Ref Range Status   12/25/2019 3.9 3.4 - 5.3 mmol/L Final     Chloride   Date Value Ref Range Status   12/25/2019 106 94 - 109 mmol/L Final     Carbon Dioxide   Date Value Ref Range Status   12/25/2019 24 20 - 32 mmol/L Final     Anion Gap   Date Value Ref Range Status   12/25/2019 7 3 - 14 mmol/L Final     Glucose   Date Value Ref Range Status   12/25/2019 100 (H) 70 - 99 mg/dL Final     Urea Nitrogen   Date Value Ref Range Status   12/25/2019 10 7 - 30 mg/dL Final     Creatinine   Date Value Ref Range Status   12/25/2019 0.71 0.52 - 1.04 mg/dL Final     GFR Estimate   Date Value Ref Range Status   12/25/2019 >90 >60 mL/min/[1.73_m2] Final     Comment:     Non "  GFR Calc  Starting 12/18/2018, serum creatinine based estimated GFR (eGFR) will be   calculated using the Chronic Kidney Disease Epidemiology Collaboration   (CKD-EPI) equation.       Calcium   Date Value Ref Range Status   12/25/2019 9.5 8.5 - 10.1 mg/dL Final     Bilirubin Total   Date Value Ref Range Status   12/25/2019 0.4 0.2 - 1.3 mg/dL Final     Alkaline Phosphatase   Date Value Ref Range Status   12/25/2019 57 40 - 150 U/L Final     ALT   Date Value Ref Range Status   12/25/2019 16 0 - 50 U/L Final     AST   Date Value Ref Range Status   12/25/2019 16 0 - 45 U/L Final

## 2019-12-30 VITALS
TEMPERATURE: 99.7 F | HEIGHT: 62 IN | RESPIRATION RATE: 16 BRPM | WEIGHT: 164.3 LBS | OXYGEN SATURATION: 99 % | BODY MASS INDEX: 30.24 KG/M2 | DIASTOLIC BLOOD PRESSURE: 82 MMHG | SYSTOLIC BLOOD PRESSURE: 115 MMHG | HEART RATE: 108 BPM

## 2019-12-30 PROCEDURE — 12400001 ZZH R&B MH UMMC

## 2019-12-30 PROCEDURE — 99233 SBSQ HOSP IP/OBS HIGH 50: CPT | Performed by: PSYCHIATRY & NEUROLOGY

## 2019-12-30 PROCEDURE — 99207 ZZC NON-BILLABLE SERV PER CHARTING: CPT | Performed by: PHYSICIAN ASSISTANT

## 2019-12-30 PROCEDURE — 25000132 ZZH RX MED GY IP 250 OP 250 PS 637: Performed by: NURSE PRACTITIONER

## 2019-12-30 RX ORDER — OLANZAPINE 5 MG/1
5 TABLET ORAL AT BEDTIME
Status: DISCONTINUED | OUTPATIENT
Start: 2019-12-30 | End: 2019-12-31 | Stop reason: HOSPADM

## 2019-12-30 RX ADMIN — ACETAMINOPHEN 650 MG: 325 TABLET, FILM COATED ORAL at 22:21

## 2019-12-30 ASSESSMENT — PAIN DESCRIPTION - DESCRIPTORS: DESCRIPTORS: HEADACHE

## 2019-12-30 ASSESSMENT — ACTIVITIES OF DAILY LIVING (ADL)
ORAL_HYGIENE: INDEPENDENT
ORAL_HYGIENE: INDEPENDENT
DRESS: SCRUBS (BEHAVIORAL HEALTH)
DRESS: SCRUBS (BEHAVIORAL HEALTH)
HYGIENE/GROOMING: INDEPENDENT
HYGIENE/GROOMING: HANDWASHING;INDEPENDENT

## 2019-12-30 NOTE — PROGRESS NOTES
"Areliu is tense, irritable and mordacious this shift. When this writer attempted to check-in with pt, she was short in her responses and would often question writers concern. \"I'll talk with my doctor\". Due to this, writer was unable to access pt for mental health symptoms.  Pt became irritable and intrusive after lunch, requesting discharge. Pt believes her 72 hour hold is over, and is unable to understand that weekends are not counted towards 72 hour hold. Staff has had to redirect pt to step away from desk several times, as she was becoming more intrusive and threatening. \"Let me out. Give me my clothes and phone or there will be problems\". She was also observed lingering around unit door, attempting to open doors.   Pt requested first and last names of staff and providers, and was informed that we will only give staff first names. She was given number to Patient Advocate. She also requested her medical records and was given appropriate number to make said request.            12/30/19 5249   Behavioral Health   Hallucinations denies / not responding to hallucinations   Thinking confused;paranoid   Orientation person: oriented;place: oriented   Insight poor   Judgement impaired   Eye Contact at examiner   Affect tense;irritable   Mood irritable   Physical Appearance/Attire appears stated age   Hygiene body odor   Suicidality   (RUCHI)   Self Injury   (None observed)   Elopement Statements about wanting to leave   Activity hyperactive (agitated, impulsive)   Speech clear;coherent   Psychomotor / Gait balanced;steady   Activities of Daily Living   Hygiene/Grooming independent   Oral Hygiene independent   Dress scrubs (behavioral health)   Room Organization independent     "

## 2019-12-30 NOTE — PROGRESS NOTES
CLINICAL NUTRITION SERVICES - BRIEF NOTE     Nutrition Prescription    Recommendations already ordered by Registered Dietitian (RD):  Changed diet order to vegetarian, with a comments that says pt eats fish   Family/Pt request: Patient is pescatarian - feels she is not getting nutrition she needs without fish on the menu    NEW FINDINGS   Current Diet order: Vegan, Eats fish including tuna    INTERVENTIONS  Implementation  Changed diet order  Called unit staff to let them know RD is changing diet order to pescatarian. Menu will now include pescatarian offerings including fish.     Follow Up/ Monitoring  No nutrition follow-up warranted at this time. RD to sign off. Please consult if further needs arise.     Lucie Arrieta RD, LD  Unit pager: [phone]

## 2019-12-30 NOTE — PLAN OF CARE
"Patient appears very preoccupied.  She states she is very anxious and depressed but not suicidal and does not know why she needs to be hospitalized.  Despite her 72 hour hold expiring on 1/2/20, she plans to request discharge tomorrow.  Patient requested to see her provider as soon as possible tomorrow morning.  A dietary consult was ordered at patient's request because she feels \"feverish and weak\" (VS WNL).  Patient states she is a pescatarian and these sensations of debilitation are because she cannot order fish on the hospital menus. Patient endorses extreme anxiety but refused PRN Vistaril and stated the only drug she will take is Xanax.  Patient reports she is also bothered by a male peer pacing in the halls past her door and wants him to stop.  She has developed an intense focus on a specific male staff and inquires about his location on the unit frequently.  Patient has continued to refuse labs and vital signs.  At 2300 she came to writer to complain that nobody had treated the open area on her left knee, although she had not reported it earlier to me.  Patient was given bacitracin and a bandaid.  She asked a friend this evening to bring her things and then refused to let friend visit or drop off the items she'd requested, just as she did to family on the day shift.  Friend's name is Constance, phone number ; she would like to provide collateral information and get some advice about how to deal with this situation.  "

## 2019-12-30 NOTE — PROGRESS NOTES
"Patient refused lab draw this am. Attempted to discuss this with her, however she stated she preferred to sleep and did not want to discuss the lab draw. Let her know she will be meeting with her psychiatrist today and will have the opportunity to discuss labs with him at that time. She additionally refused to have vital signs taken. Stated \"I will just wait for the doctor for that\".     Informed Dr. Johnson of patient refusal of the aforementioned cares. Discussed again with patient importance of lab draw and vital signs for information on patient's physical health while Dr. Johnson was meeting with the patient. She continued to refuse both labs and vital signs. Labs cancelled. Patient denied any physical pain, denied any abnormal urinary sx, denied feeling ill or like she had an elevated temperature. Will continue to monitor and get these assessments as able.     Patient changed her diet multiple times. Initially stated she only ate fish, then requested eggs, then requested a regular diet \"I would like to try some meat if I like\". Regular diet ordered.     Patient becoming more agitated after lunch. Standing at desk, escalating over demands regarding her 72 hold. Demanding to be let off the unit. Given copy of 72 hold and patient rights related to 72 hold. Attempted to explain timeline for 72 hold as her hold spanned a weekend and a holiday (New Years Day). Patient appeared to have trouble tracking and understanding this information despite multiple attempts to explain to her.     "

## 2019-12-30 NOTE — PROGRESS NOTES
CTC called [facility] pre-petition screening, [phone]. Gave verbal report to Brenden.    Faxed petition to [phone]

## 2019-12-31 PROCEDURE — 99239 HOSP IP/OBS DSCHRG MGMT >30: CPT | Performed by: PSYCHIATRY & NEUROLOGY

## 2019-12-31 NOTE — PROGRESS NOTES
12/30/19 2100   Therapeutic Recreation   Type of Intervention structured groups   Activity game   Response Refuses     Pt briefly attended the Therapeutic Recreation group with focus on leisure participation and social engagement.  Pt chose not to participate in the group recreational intervention via a group game.  Pt left group after a few minutes. Pt was not engaged in the discussion prior to leaving, focusing on signs on the wall from other groups. Pt stated she is only trying to bring her heart rate down without having to rely on medicine.

## 2019-12-31 NOTE — PROGRESS NOTES
Attempted to see for fevers that patient had on 12/28 on 12/30 as that is when she agreed to visit. See note by Paris Sterling dated 12/29. Discussed with BRISA Faulkner, patient refused VS and w/u today. Per psychiatry, patient is decisional.     Please notify medicine if open to w/u or if continued fevers for consideration of further w/u as we would be happy to help.     Melvina Lazar PA-C

## 2019-12-31 NOTE — PROGRESS NOTES
JED from Dr. Sagastume, pre-petition screening. They are not supporting the petition. [phone]    [facility] contacted Dr. Valencia as Dr. RICHARD is not here today and discussed.     Dr. Valencia will meet with pt.     [facility] called the business office and also met with pt who now agrees to meet with someone to do an insurance application. However, once pt was advised that the petition wasn't supported; she requested immediate discharge and declined to meet with the business office. Pt states that she worked for [employer] and can file on her own.

## 2019-12-31 NOTE — PROGRESS NOTES
"  [facility], [place]   Psychiatric Progress Note        Interim History:   The patient's care was discussed with the treatment team during the daily team meeting and/or staff's chart notes were reviewed.  Staff report patient spent a lot of time in her room, had poor sleep since admission to this unit. Was focused on being discharged from this hospital ASAP. Today she was seen 2 times. First time we discussed her symptoms, refusal to allow us to talk to her mother. Fiona again refused to allow us to talk to her mother and was minimally cooperative in signing LEONARD to talk to her aunt only agreed to do so after was told that refusal to sign it might delay her discharge. She was singing gospels during visit with me and refused to stop. Refused to allow to check her vitals or do labs: \"no, thank you\". Appeared to have a lot of difficulties tracking things. Continued to maintain that it was her mother who removed clothes with Fiona and: \"I am done with her\". Requested to be transferred to [facility] from: \"this clinic\". The 2nd time she mostly asked about what could she do to get discharged from here. I reminded her about concerns that this treatment team and her family had and that withholding information would not help her to be discharged. Suggested to be very open with county screener who will evaluate her tomorrow. She was quiet, I am not sure how much she retained from our conversation.          Medications:          Allergies:   No Known Allergies       Labs:   No results found for this or any previous visit (from the past 24 hour(s)).       Psychiatric Examination:     /82   Pulse 108   Temp 99.7  F (37.6  C) (Oral)   Resp 16   Ht 1.575 m (5' 2\")   Wt 74.5 kg (164 lb 4.8 oz)   LMP 12/25/2019   SpO2 99%   BMI 30.05 kg/m    Weight is 164 lbs 4.8 oz  Body mass index is 30.05 kg/m .  Orthostatic Vitals     None        Appearance: awake, alert, dressed in hospital scrubs and " appeared as age stated  Attitude:  guarded and uncooperative  Eye Contact:  fair  Mood:  anxious  Affect:  constricted mobility  Speech:  decreased prosody  Psychomotor Behavior:  no evidence of tardive dyskinesia, dystonia, or tics  Throught Process:  poorly organized  Associations:  loosening of associations present  Thought Content:  no evidence of suicidal ideation or homicidal ideation  Insight:  limited  Judgement:  limited  Oriented to:  self, time, keeps saying that she is at clinic  Attention Span and Concentration:  poor  Recent and Remote Memory:  poor    Clinical Global Impressions  First:     Most recent:            Precautions:     Behavioral Orders   Procedures     Assault precautions     Code 1 - Restrict to Unit     Elopement precautions     Routine Programming     As clinically indicated     Sexual precautions     Single Room     Status 15     Every 15 minutes.          DIagnoses:     1.  Substance-induced psychosis?   2.  Rule out bipolar affective disorder, steve.   3.  Rule out schizophrenia spectrum disorder.   4.  Rule out cannabis use disorder.          Plan:     Refuses meds, labs, vitals, demands to be discharged despite still very likely present symptoms of steve. Will file petition for commitment and [place].

## 2019-12-31 NOTE — PROGRESS NOTES
"Fiona was tense, irritable at times, she was upset with specific peer. She was crying one minute and she was laughing the next. Fiona seem to have difficulty understanding her mental health issues, she just cant belive she is in mental health hospital \"like crazy people\". She was watching TV, socializing with her peers and she was on the phone. She ada SI/SIB/HI and hallucination. She does admits to depression and anxiety rating both as 8/10.  She is rude to staff and other when her needs are not being met right a way. Fiona want to listen linkedÃ¼ music because it calms her down and she feels like the hospital should provide that for her.        12/30/19 2943   Behavioral Health   Hallucinations denies / not responding to hallucinations   Thinking poor concentration   Orientation place: oriented;person: oriented;date: oriented;time: oriented   Memory baseline memory   Insight poor;denial of illness   Judgement impaired   Eye Contact at examiner   Affect full range affect;blunted, flat;angry;tense   Mood mood is calm;anxious   Physical Appearance/Attire appears stated age   Hygiene well groomed   1. Wish to be Dead (Recent) No   2. Non-Specific Active Suicidal Thoughts (Recent) No   Enviromental Risk Factors None   Activity hyperactive (agitated, impulsive)   Speech rambling   Psycho Education   Type of Intervention 1:1 intervention   Response participates, initiates socially appropriate   Hours 0.5   Activities of Daily Living   Hygiene/Grooming handwashing;independent   Oral Hygiene independent   Dress scrubs (behavioral health)   Room Organization independent     "

## 2019-12-31 NOTE — PROGRESS NOTES
Patient was discharged AMA from [address] at approximately 1300. Discharge instructions were given to patient. She refused to sign the Consent for Treatment when she discharged. Patient denied having suicidal thoughts at the time of discharge. Patient declined to speak to business office regarding insurance. Patient declined to have medication ordered for her at discharge. All of patient's personal belongings were returned to her at time of discharge.

## 2019-12-31 NOTE — DISCHARGE SUMMARY
Psychiatric Discharge Summary    Fiona Mak MRN# [id number]   Age: 24 year old YOB: 1995     Date of Admission:  12/27/2019  Date of Discharge:  12/31/2019  Admitting Physician:  Saturnino Johnson MD  Discharge Physician:  Jaquan Valencia MD          Event Leading to Hospitalization:   The patient is a 24-year-old female who was admitted on a 72-hour hold due to concern of out-of-control, agitated and bizarre behavior.        See Admission note by Saturnino Johnson MD on 12/27/2019 for additional details.          Diagnoses:     1.  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  Likely cannabis use disorder, moderate to severe  3.  Rule out bipolar affective disorder, steve.   4.  Rule out schizophrenia spectrum disorder.          Labs:     Recent Results (from the past 336 hour(s))   ISTAT HCG Quantitative Pregnancy POCT    Collection Time: 12/25/19 11:54 AM   Result Value Ref Range    HCG Quantitative Serum <5.0 <5.0 IU/L   CBC with platelets differential    Collection Time: 12/25/19 11:57 AM   Result Value Ref Range    WBC 11.2 (H) 4.0 - 11.0 10e9/L    RBC Count 4.44 3.8 - 5.2 10e12/L    Hemoglobin 13.0 11.7 - 15.7 g/dL    Hematocrit 38.7 35.0 - 47.0 %    MCV 87 78 - 100 fl    MCH 29.3 26.5 - 33.0 pg    MCHC 33.6 31.5 - 36.5 g/dL    RDW 13.2 10.0 - 15.0 %    Platelet Count 322 150 - 450 10e9/L    Diff Method Automated Method     % Neutrophils 84.6 %    % Lymphocytes 9.8 %    % Monocytes 5.3 %    % Eosinophils 0.0 %    % Basophils 0.1 %    % Immature Granulocytes 0.2 %    Nucleated RBCs 0 0 /100    Absolute Neutrophil 9.4 (H) 1.6 - 8.3 10e9/L    Absolute Lymphocytes 1.1 0.8 - 5.3 10e9/L    Absolute Monocytes 0.6 0.0 - 1.3 10e9/L    Absolute Eosinophils 0.0 0.0 - 0.7 10e9/L    Absolute Basophils 0.0 0.0 - 0.2 10e9/L    Abs Immature Granulocytes 0.0 0 - 0.4 10e9/L    Absolute Nucleated RBC 0.0    Comprehensive metabolic panel    Collection Time: 12/25/19 11:57 AM   Result Value  Ref Range    Sodium 137 133 - 144 mmol/L    Potassium 3.9 3.4 - 5.3 mmol/L    Chloride 106 94 - 109 mmol/L    Carbon Dioxide 24 20 - 32 mmol/L    Anion Gap 7 3 - 14 mmol/L    Glucose 100 (H) 70 - 99 mg/dL    Urea Nitrogen 10 7 - 30 mg/dL    Creatinine 0.71 0.52 - 1.04 mg/dL    GFR Estimate >90 >60 mL/min/[1.73_m2]    GFR Estimate If Black >90 >60 mL/min/[1.73_m2]    Calcium 9.5 8.5 - 10.1 mg/dL    Bilirubin Total 0.4 0.2 - 1.3 mg/dL    Albumin 4.0 3.4 - 5.0 g/dL    Protein Total 8.2 6.8 - 8.8 g/dL    Alkaline Phosphatase 57 40 - 150 U/L    ALT 16 0 - 50 U/L    AST 16 0 - 45 U/L   Acetaminophen level    Collection Time: 12/25/19 11:57 AM   Result Value Ref Range    Acetaminophen Level <2 mg/L   Salicylate level    Collection Time: 12/25/19 11:57 AM   Result Value Ref Range    Salicylate Level <2 mg/dL   UA with Microscopic    Collection Time: 12/25/19 11:59 AM   Result Value Ref Range    Color Urine Light Red     Appearance Urine Slightly Cloudy     Glucose Urine Negative NEG^Negative mg/dL    Bilirubin Urine Negative NEG^Negative    Ketones Urine 10 (A) NEG^Negative mg/dL    Specific Gravity Urine 1.020 1.003 - 1.035    Blood Urine Large (A) NEG^Negative    pH Urine 6.0 5.0 - 7.0 pH    Protein Albumin Urine 100 (A) NEG^Negative mg/dL    Urobilinogen mg/dL Normal 0.0 - 2.0 mg/dL    Nitrite Urine Negative NEG^Negative    Leukocyte Esterase Urine Moderate (A) NEG^Negative    Source Midstream Urine     WBC Urine 24 (H) 0 - 5 /HPF    RBC Urine >182 (H) 0 - 2 /HPF    Squamous Epithelial /HPF Urine 18 (H) 0 - 1 /HPF   Drug abuse screen 77 urine ([place], [place], [place])    Collection Time: 12/25/19 11:59 AM   Result Value Ref Range    Amphetamine Qual Urine Negative NEG^Negative    Barbiturates Qual Urine Negative NEG^Negative    Benzodiazepine Qual Urine Negative NEG^Negative    Cannabinoids Qual Urine Positive (A) NEG^Negative    Cocaine Qual Urine Negative NEG^Negative    Opiates Qualitative Urine Negative NEG^Negative    PCP Qual  Urine Negative NEG^Negative   Drug abuse screen 77 urine ([place])    Collection Time: 12/26/19  9:40 PM   Result Value Ref Range    Amphetamine Qual Urine Negative NEG^Negative    Barbiturates Qual Urine Negative NEG^Negative    Benzodiazepine Qual Urine Negative NEG^Negative    Cannabinoids Qual Urine Positive (A) NEG^Negative    Cocaine Qual Urine Negative NEG^Negative    Opiates Qualitative Urine Negative NEG^Negative    PCP Qual Urine Negative NEG^Negative                Consults:   No consultations were requested during this admission         Hospital Course:   Fiona Mak was admitted to [facility] with attending Saturnino Johnson MD on a 72 hour mental health hold. The patient was placed under status 15 (15 minute checks) to ensure patient safety. Dr. Johnson petitioned for commitment. She was seen by pre-petition screening on am of 12/31/19. The petition was not supported. There were no additional behaviors that would justify and appeal, as a result, we are obligated to drop her hold and change her to voluntary status. I met with the patient as Dr. Johnson was not in the hospital for the day. She was not agreeable to staying in the hospital. She was evaluated for imminent risk and discharged as I believed there was insufficient evidence to support that she was at imminent risk to herself or others due to mental illness.    The patient refused the olanzapine ordered by the admitting provider and was not interested in me sending any medications home with her. She planned to use her own money to get transportation back to [place] where her friend lived. She indicated that she had seen a psychiatrist in the past, and would follow-up on her own with that provider. She declined any assistance from us on scheduling appointments.    Based on conversations with staff and review of the record, the patient did appear to improve during her time here. It is likely that substances contributed to her  psychosis. She continued to show disorganization and mild agitation, however there wasn't documentation or verbal reports from staff members that indicated she was at risk to herself or others.    Fiona Mak was released to home. At the time of discharge Fiona Mak was determined to not be a danger to herself or others.          Discharge Medications:   There are no discharge medications for this patient. She declined medications despite my recommendation.           Psychiatric Examination:   Appearance:  awake, alert, adequately groomed and dressed in hospital scrubs  Attitude:  cooperative  Eye Contact:  good  Mood:  anxious  Affect:  guarded  Speech:  clear, coherent and normal prosody  Psychomotor Behavior:  no evidence of tardive dyskinesia, dystonia, or tics  Thought Process:  goal oriented and somewhat disorganized  Associations:  no loose associations  Thought Content:  possible paranoia as she insisted on keeping the door open during the interview. No signs of internal stimuli during our conversation. Denies all hallucinations. Denies suicidal or homicidal thoughts.  Insight:  limited  Judgment:  limited  Oriented to:  time, person, and place  Attention Span and Concentration:  intact  Recent and Remote Memory:  intact  Language: Able to name objects, Able to repeat phrases and Able to read and write  Fund of Knowledge: appropriate  Muscle Strength and Tone: normal  Gait and Station: Normal         Discharge Plan:   Patient declined assistance with insurance application or scheduling of follow-up appointments.She was provided numbers for crisis resources. I advised her to follow-up with a provider that she was comfortable with and suggested that she avoid all drugs as they could have been the reason she was hospitalized.    Attestation:  The patient has been seen and evaluated by me,  Jaquan Valencia MD  On the day of discharge, I saw the patient and performed the above examination, reviewed  discharge medications, reviewed follow-up plan, and assessed safety for discharge. I spent greater than 30 minutes on these tasks.

## 2019-12-31 NOTE — PLAN OF CARE
"Patient was irritable and exhibited disorganized thinking early in the shift.  She stated she wanted to listen to  music and writer checked [facility] programs for her to listen to on headphones, but patient replied \"my kind of music is never on the radio.\"  She was also very angry because none of the writing implements patients are allowed to use were to her liking.  \"I'm not a child, I should be able to use whatever I want.\"  She complained twice during the shift of extreme anxiety but refused PRN vistaril and zyprexa both times, as well as scheduled hs zyprexa.  \"There's nothing wrong with me.  I want to only use natural things.\"  She later did take tylenol for a headache.  Patient was informed the district court will be looking at her compliance with provider's orders and her medication refusal has the potential to lengthen her stay.  She allowed VS to be taken at 1600 but pulled thermometer out of her mouth at 2000 and walked away.  Thinking is very scattered and she often moves from request to request with insufficient time to fulfill any of them.  Patient also complained because she does not have a bra.  She was reminded that she had called an aunt and her friend Constance yesterday asking them to bring her things, then refused to see them or accept what they'd brought her once they'd arrived.  Patient was encouraged to request a bra from them and then accept it if they are able to bring her one.  "

## 2019-12-31 NOTE — DISCHARGE INSTRUCTIONS
Behavioral Discharge Planning and Instructions      Summary:  You were admitted on 12/27/2019  due to Disorganized Thinking/Behaviors.  You were treated by Dr. Saturnino Johnson MD and discharged on 12/31/2019 from [facility] to Home    A petition for commitment was made but not supported by the [facility]. We have encouraged you to stay to receive treatment; you have declined. You are discharging AMA.     Principal Diagnosis:   1.  Substance-induced psychosis  2.  Rule out bipolar affective disorder, steve.   3.  Rule out schizophrenia spectrum disorder.   4.  Rule out cannabis use disorder.     Health Care Follow-up Appointments:   You do not currently have active insurance. You declined to meet with the business office to apply for insurance; you've stated that you will apply on your own. You can go to the following clinics which offer free or sliding fee services:   [facility] (also has dental services)  [address] [place] [address]  [phone]    [facility] (also has dental services)  [address], [place] - [address]  Phone: [phone]     [facility]  [address] [place] [address]  Hours: Monday, Wednesday, and Friday from 1-3pm; Monday through Thursday from 6:30pm-8:30pm   Phone: [phone]    No appointment is necessary; no paperwork; no fee.    [address] [place] [address] Triage Open Monday through Friday  Medical and Behavioral Health Triage is a new service at [address] [place] [address] that brings together community-based mental health agencies, [facility] , and [facility] ([facility]) to address our clients' complex needs. The goal of this program is to reach people that are not already receiving services or that are not already connected to case management who have an urgent concern related to their mental health or substance use disorder.   Care coordinators, peer recovery  specialists, and health professionals at [address] [place] [address] will address clients' physical health, mental health, addiction issues - and also the wrap-around services that they need to stay healthy, including housing, health insurance, and other resources.   Triage is located in [facility] at [address] [place] [address]. It is accessible by police from the parking lot. Everyone else can come through the front door of [address] [place] [address], and follow signs to N1.     Triage hours:10:00 am - 5:00 pm  Triage Phone Number: [phone]  Location: [address] [place] [address], room N141     Attend all scheduled appointments with your outpatient providers. Call at least 24 hours in advance if you need to reschedule an appointment to ensure continued access to your outpatient providers.   Major Treatments, Procedures and Findings:  You were provided with: a psychiatric assessment, assessed for medical stability, medication evaluation and/or management, group therapy and milieu management    Symptoms to Report: feeling more aggressive, increased confusion, losing more sleep, mood getting worse or thoughts of suicide    Early warning signs can include: increased depression or anxiety sleep disturbances increased thoughts or behaviors of suicide or self-harm  increased unusual thinking, such as paranoia or hearing voices    Safety and Wellness:  Take all medicines as directed.  Make no changes unless your doctor suggests them.      Follow treatment recommendations.  Refrain from alcohol and non-prescribed drugs.  Ask your support system to help you reduce your access to items that could harm yourself or others. If there is a concern for safety, call 911.    Resources:   [employer] [place] [employer] Mental Illness (www.[place].mauricio.org): 185.521.9022 or 853-399-4526.  [facility] Crisis (COPE) Response - Adult [phone]    Lifestyle Adjustment:   1. Adjust your lifestyle to get enough sleep, relaxation, exercise and good nutrition.  Continue to develop healthy coping skills to  decrease stress and promote a healthy  lifestyle.  2. Abstain from all substances of abuse.  3. Take medications as prescribed.  Please work with your doctor to discuss any concerns you have with your medications or side effects you may be experiencing.  4. Follow up with appointments as scheduled.      General Medication Instructions:   1. See your medication sheet(s) for instructions.   2. Take all medicines as directed.  Make no changes unless your doctor suggests them.   3. Go to all your doctor visits.  4. Be sure to have all your required lab tests. This way, your medicines can be refilled on time.  5. Do not use any drugs not prescribed by your doctor.      The treatment team has appreciated the opportunity to work with you.  We wish you the best in the future.    If you have any questions or concerns our unit number is [phone].     If you would like to obtain any specific documentation regarding your hospitalization after your discharge, contact [facility] of Information/Medical Records:  [phone]

## 2019-12-31 NOTE — PROGRESS NOTES
"Patient reported she was having \"extreme anxiety, I feel like my heart is beating out of my chest; I don't like to take medications but is there anything I could take?\" Reviewed with her that olanzapine or hydroxyzine available to treat her anxiety. Offered to recheck her vital signs, which she declined (did allow these to be done earlier). She requested printed information on medications which was provided. Stated \"well since I don't have schizophrenia I will try the other one\" Clarified for her the different treatment indications for olanzapine. She looked at package for hydroxyzine then stated, well maybe I will come back to take it at bedtime.   "

## 2020-06-19 NOTE — PROGRESS NOTES
"  Admission Date/time/from: 12/27/19 at 0419 via cart from [facility].    Reason for admission: psychosis-underdeterimed cause, disorganized/confused thinking, impulsivity    Voluntary/hold- 72 HH    Last reported substance use: Patient denies use but UTOX was positive for cannabinoids    Patient admitted to [facility] at 0419. Patient was oriented to unit and partial admission was completed as patient was eager to go to sleep. During interaction/interview, patient's facial expression indicated she was scared/nervous although she denied it and stated she did not have any questions when asked (several times). Thoughts also seemed to be disorganized as when writer asked if patient knew how tall she was she replied \"either 5'2\" or 5'8\" \". When it was explained that she was likely not 5'8\" (based on height chart) she laughed and said \"Oh ya, I meant 5'2\" or 5'3\" \".     Day shift informed admission needs to be completed. Patient denied HI, SI/SIB and contracted for safety. Although disorganized, patient was calm and cooperative and went directly to sleep.     No other concerns noted at this time. Will continue to monitor.  " Problem: Musculor/Skeletal Functional Status  Intervention: PT Evaluation/treatment  Note: Increase safety and independence with functional mobility.

## 2024-02-24 ENCOUNTER — HOSPITAL ENCOUNTER (EMERGENCY)
Facility: CLINIC | Age: 29
Discharge: PSYCHIATRIC OR SUBSTANCE USE FACILITY | End: 2024-02-26
Attending: EMERGENCY MEDICINE | Admitting: EMERGENCY MEDICINE
Payer: COMMERCIAL

## 2024-02-24 DIAGNOSIS — F23 ACUTE PSYCHOSIS (H): ICD-10-CM

## 2024-02-24 DIAGNOSIS — J10.1 INFLUENZA B: ICD-10-CM

## 2024-02-24 PROCEDURE — 99284 EMERGENCY DEPT VISIT MOD MDM: CPT | Mod: 25

## 2024-02-24 PROCEDURE — 96374 THER/PROPH/DIAG INJ IV PUSH: CPT

## 2024-02-24 PROCEDURE — 96375 TX/PRO/DX INJ NEW DRUG ADDON: CPT

## 2024-02-24 ASSESSMENT — COLUMBIA-SUICIDE SEVERITY RATING SCALE - C-SSRS
1. IN THE PAST MONTH, HAVE YOU WISHED YOU WERE DEAD OR WISHED YOU COULD GO TO SLEEP AND NOT WAKE UP?: NO
2. HAVE YOU ACTUALLY HAD ANY THOUGHTS OF KILLING YOURSELF IN THE PAST MONTH?: NO
6. HAVE YOU EVER DONE ANYTHING, STARTED TO DO ANYTHING, OR PREPARED TO DO ANYTHING TO END YOUR LIFE?: NO

## 2024-02-25 ENCOUNTER — TELEPHONE (OUTPATIENT)
Dept: BEHAVIORAL HEALTH | Facility: CLINIC | Age: 29
End: 2024-02-25
Payer: COMMERCIAL

## 2024-02-25 PROBLEM — F41.9 ANXIETY: Status: ACTIVE | Noted: 2024-02-25

## 2024-02-25 PROBLEM — F32.9 DEPRESSION, REACTIVE: Status: ACTIVE | Noted: 2024-02-25

## 2024-02-25 LAB
AMPHETAMINES UR QL SCN: ABNORMAL
ANION GAP SERPL CALCULATED.3IONS-SCNC: 7 MMOL/L (ref 7–15)
APAP SERPL-MCNC: <5 UG/ML (ref 10–30)
BARBITURATES UR QL SCN: ABNORMAL
BASOPHILS # BLD AUTO: 0 10E3/UL (ref 0–0.2)
BASOPHILS NFR BLD AUTO: 0 %
BENZODIAZ UR QL SCN: ABNORMAL
BUN SERPL-MCNC: 12.6 MG/DL (ref 6–20)
BZE UR QL SCN: ABNORMAL
CALCIUM SERPL-MCNC: 9.3 MG/DL (ref 8.6–10)
CANNABINOIDS UR QL SCN: ABNORMAL
CHLORIDE SERPL-SCNC: 104 MMOL/L (ref 98–107)
CREAT SERPL-MCNC: 0.65 MG/DL (ref 0.51–0.95)
DEPRECATED HCO3 PLAS-SCNC: 26 MMOL/L (ref 22–29)
EGFRCR SERPLBLD CKD-EPI 2021: >90 ML/MIN/1.73M2
EOSINOPHIL # BLD AUTO: 0.2 10E3/UL (ref 0–0.7)
EOSINOPHIL NFR BLD AUTO: 3 %
ERYTHROCYTE [DISTWIDTH] IN BLOOD BY AUTOMATED COUNT: 13.4 % (ref 10–15)
ETHANOL SERPL-MCNC: <0.01 G/DL
FENTANYL UR QL: ABNORMAL
FLUAV RNA SPEC QL NAA+PROBE: NEGATIVE
FLUBV RNA RESP QL NAA+PROBE: POSITIVE
GLUCOSE SERPL-MCNC: 82 MG/DL (ref 70–99)
GROUP A STREP BY PCR: NOT DETECTED
HCG SERPL QL: NEGATIVE
HCT VFR BLD AUTO: 38.8 % (ref 35–47)
HGB BLD-MCNC: 12.7 G/DL (ref 11.7–15.7)
IMM GRANULOCYTES # BLD: 0 10E3/UL
IMM GRANULOCYTES NFR BLD: 0 %
LYMPHOCYTES # BLD AUTO: 3.1 10E3/UL (ref 0.8–5.3)
LYMPHOCYTES NFR BLD AUTO: 44 %
MCH RBC QN AUTO: 30.3 PG (ref 26.5–33)
MCHC RBC AUTO-ENTMCNC: 32.7 G/DL (ref 31.5–36.5)
MCV RBC AUTO: 93 FL (ref 78–100)
MONOCYTES # BLD AUTO: 0.5 10E3/UL (ref 0–1.3)
MONOCYTES NFR BLD AUTO: 7 %
NEUTROPHILS # BLD AUTO: 3.3 10E3/UL (ref 1.6–8.3)
NEUTROPHILS NFR BLD AUTO: 46 %
NRBC # BLD AUTO: 0 10E3/UL
NRBC BLD AUTO-RTO: 0 /100
OPIATES UR QL SCN: ABNORMAL
PCP QUAL URINE (ROCHE): ABNORMAL
PLATELET # BLD AUTO: 269 10E3/UL (ref 150–450)
POTASSIUM SERPL-SCNC: 4 MMOL/L (ref 3.4–5.3)
RBC # BLD AUTO: 4.19 10E6/UL (ref 3.8–5.2)
RSV RNA SPEC NAA+PROBE: NEGATIVE
SALICYLATES SERPL-MCNC: <0.3 MG/DL
SARS-COV-2 RNA RESP QL NAA+PROBE: NEGATIVE
SARS-COV-2 RNA RESP QL NAA+PROBE: NEGATIVE
SODIUM SERPL-SCNC: 137 MMOL/L (ref 135–145)
WBC # BLD AUTO: 7.1 10E3/UL (ref 4–11)

## 2024-02-25 PROCEDURE — 87635 SARS-COV-2 COVID-19 AMP PRB: CPT | Performed by: EMERGENCY MEDICINE

## 2024-02-25 PROCEDURE — 87651 STREP A DNA AMP PROBE: CPT | Performed by: STUDENT IN AN ORGANIZED HEALTH CARE EDUCATION/TRAINING PROGRAM

## 2024-02-25 PROCEDURE — 80048 BASIC METABOLIC PNL TOTAL CA: CPT | Performed by: EMERGENCY MEDICINE

## 2024-02-25 PROCEDURE — 87637 SARSCOV2&INF A&B&RSV AMP PRB: CPT | Performed by: STUDENT IN AN ORGANIZED HEALTH CARE EDUCATION/TRAINING PROGRAM

## 2024-02-25 PROCEDURE — 250N000013 HC RX MED GY IP 250 OP 250 PS 637: Performed by: EMERGENCY MEDICINE

## 2024-02-25 PROCEDURE — 87491 CHLMYD TRACH DNA AMP PROBE: CPT | Performed by: EMERGENCY MEDICINE

## 2024-02-25 PROCEDURE — 36415 COLL VENOUS BLD VENIPUNCTURE: CPT | Performed by: EMERGENCY MEDICINE

## 2024-02-25 PROCEDURE — 84703 CHORIONIC GONADOTROPIN ASSAY: CPT | Performed by: STUDENT IN AN ORGANIZED HEALTH CARE EDUCATION/TRAINING PROGRAM

## 2024-02-25 PROCEDURE — 80143 DRUG ASSAY ACETAMINOPHEN: CPT | Performed by: STUDENT IN AN ORGANIZED HEALTH CARE EDUCATION/TRAINING PROGRAM

## 2024-02-25 PROCEDURE — 80307 DRUG TEST PRSMV CHEM ANLYZR: CPT | Performed by: EMERGENCY MEDICINE

## 2024-02-25 PROCEDURE — 36415 COLL VENOUS BLD VENIPUNCTURE: CPT | Performed by: STUDENT IN AN ORGANIZED HEALTH CARE EDUCATION/TRAINING PROGRAM

## 2024-02-25 PROCEDURE — 80179 DRUG ASSAY SALICYLATE: CPT | Performed by: STUDENT IN AN ORGANIZED HEALTH CARE EDUCATION/TRAINING PROGRAM

## 2024-02-25 PROCEDURE — 82077 ASSAY SPEC XCP UR&BREATH IA: CPT | Performed by: STUDENT IN AN ORGANIZED HEALTH CARE EDUCATION/TRAINING PROGRAM

## 2024-02-25 PROCEDURE — 85025 COMPLETE CBC W/AUTO DIFF WBC: CPT | Performed by: EMERGENCY MEDICINE

## 2024-02-25 PROCEDURE — 250N000013 HC RX MED GY IP 250 OP 250 PS 637: Performed by: STUDENT IN AN ORGANIZED HEALTH CARE EDUCATION/TRAINING PROGRAM

## 2024-02-25 RX ORDER — ACETAMINOPHEN 325 MG/1
650 TABLET ORAL ONCE
Status: COMPLETED | OUTPATIENT
Start: 2024-02-25 | End: 2024-02-25

## 2024-02-25 RX ORDER — IBUPROFEN 600 MG/1
600 TABLET, FILM COATED ORAL ONCE
Status: COMPLETED | OUTPATIENT
Start: 2024-02-25 | End: 2024-02-25

## 2024-02-25 RX ADMIN — IBUPROFEN 600 MG: 600 TABLET ORAL at 22:18

## 2024-02-25 RX ADMIN — IBUPROFEN 600 MG: 600 TABLET ORAL at 06:33

## 2024-02-25 RX ADMIN — ACETAMINOPHEN 650 MG: 325 TABLET ORAL at 16:32

## 2024-02-25 NOTE — PHARMACY-ADMISSION MEDICATION HISTORY
Pharmacist Admission Medication History    Admission medication history is complete. The information provided in this note is only as accurate as the sources available at the time of the update.    Information Source(s): Patient and CareEverywhere/SureScripts via in-person    Pertinent Information: None    Changes made to PTA medication list:  Added: None  Deleted: None  Changed: None    Allergies reviewed with patient and updates made in EHR: yes    Medication History Completed By: Tawnya Graham RPH 2/25/2024 10:31 AM    No outpatient medications have been marked as taking for the 2/24/24 encounter (Hospital Encounter).

## 2024-02-25 NOTE — TELEPHONE ENCOUNTER
S: [facility], DEC  Vanessa Núñez  calling at 6:24AM about a 28 year old/Female presenting with severe anxiety and passive suicidal thoughts, no plan or intent     B: Pt arrived via Self . Presenting problem, stressors: Pt was studying abroad and suffered a foot injury. Pt went to [place] [employer] who sent her back to the [place] and pt reports her anxiety has increased. Pt is homeless, has no social support nor OP services established. Pt is not suicidal but has passive suicidal thoughts and is unable to create a safety plan. [place] at the airport because she had no where to go and was arrested for loitering.     Pt affect in ED: Flat  Pt Dx: Generalized Anxiety Disorder and ADHD  Previous [facility] hx? Yes: 2018 at [facility]  Pt  denies SI but has suicidal thoughts, no plan or intent    Hx of suicide attempt? No  Pt denies SIB  Pt denies HI   Pt denies hallucinations .   Pt RARS Score: 2    Hx of aggression/violence, sexual offenses, legal concerns, Epic care plan? describe: Pt was arrested for loitering at the airport. No aggression; sexual offenses nor Epic Care plan  Current concerns for aggression this visit? No  Does pt have a history of Civil Commitment? No  Is Pt their own guardian? Yes    Pt is not prescribed medication. Is patient medication compliant? N/A Pt has been on Xanax and Vyvanse in the past but has not taken anything since 2018  Pt denies OP services   CD concerns: None  Acute or chronic medical concerns: Foot injury - ambulates independently without assistive devices  Does Pt present with specific needs, assistive devices, or exclusionary criteria? None      Pt is ambulatory  Pt is able to perform ADLs independently      A: Pt to be reviewed for [facility] admission. Pt is Voluntary  Preferred placement:  Pt prefers outside the [facility] as she has family members that work in the [place]    COVID Symptoms: Yes: Cough  If yes, COVID test required   Utox: Ordered, not yet collected   CMP: Ordered, not yet collected   CBC:  Ordered, not yet collected   HCG: Ordered, not yet collected    R: Patient cleared and ready for behavioral bed placement: Yes  Pt placed on [facility] worklist? Yes    Does Patient need a Transfer Center request created? Yes, writer completed Transfer Center request at: 06:30

## 2024-02-25 NOTE — ED NOTES
Pt updated on DEC assessment ETA of 0600.  Offered lights to be turned down, pt given warm blankets.  Pt declined lights being turned off at this time.
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
## 2024-02-25 NOTE — ED PROVIDER NOTES
"[facility] EMERGENCY ROOM   ED Mental Health Observation - Daily Note for 2/25/2024    Fiona Mak is a 28 year old female currently boarding in the ED while awaiting placement for Psychosis.  Please see the initial H&P for this patient's presentation, workup, and disposition plan.     Hold Status:  Patient is Voluntary (NOT holdable)    Plan:  In brief, the patient's presentation is notable for acute psychosis.   Patient is awaiting Mental health placement    Interim History:  12:42 PM per BRISA Chavez inpatient mental health placement currently has inpatient beds currently but first they are requesting that we have a tylenol, salicylate, alcohol level, and pregnancy test thus those have been ordered now    Physical Exam:  /66   Pulse 84   Temp 97.7  F (36.5  C) (Oral)   Resp 18   Ht 1.575 m (5' 2\")   Wt 74.4 kg (164 lb)   SpO2 99%   BMI 30.00 kg/m    No respiratory distress, on room air   Well perfused  Behavior appropriate    Medications provided prior to my care:  Medications   ibuprofen (ADVIL/MOTRIN) tablet 600 mg (600 mg Oral $Given 2/25/24 0633)       Laboratory (reviewed and interpreted):  Labs Ordered and Resulted from Time of ED Arrival to Time of ED Departure   BASIC METABOLIC PANEL - Normal       Result Value    Sodium 137      Potassium 4.0      Chloride 104      Carbon Dioxide (CO2) 26      Anion Gap 7      Urea Nitrogen 12.6      Creatinine 0.65      GFR Estimate >90      Calcium 9.3      Glucose 82     CBC WITH PLATELETS AND DIFFERENTIAL    WBC Count 7.1      RBC Count 4.19      Hemoglobin 12.7      Hematocrit 38.8      MCV 93      MCH 30.3      MCHC 32.7      RDW 13.4      Platelet Count 269      % Neutrophils 46      % Lymphocytes 44      % Monocytes 7      % Eosinophils 3      % Basophils 0      % Immature Granulocytes 0      NRBCs per 100 WBC 0      Absolute Neutrophils 3.3      Absolute Lymphocytes 3.1      Absolute Monocytes 0.5      Absolute " Eosinophils 0.2      Absolute Basophils 0.0      Absolute Immature Granulocytes 0.0      Absolute NRBCs 0.0     COVID-19 VIRUS (CORONAVIRUS) BY PCR   URINE DRUG SCREEN PANEL   CHLAMYDIA TRACHOMATIS/NEISSERIA GONORRHOEAE BY PCR       ED Course:  7:18 AM I met with the patient and discussed plan with care team.     Impression/Plan:  1. Acute psychosis (H)        MD Arun Mitchell Michael, MD  02/25/24 7046

## 2024-02-25 NOTE — TELEPHONE ENCOUNTER
**OUTSIDE OF [facility]**    R: [facility] Call Log 2/25/2024 8AM   Intake has called facilities that have not updated their bed status within the last 12 hours.??    Adults:            *[facility]: Posting 2 beds. Mixed unit/Low acuity only.  [phone]   [facility]: Posting 1 bed.  Low acuity, No aggression  @cap until tomorrow  [facility]: @ cap per website ECT Tx offered [phone]  to call back at 8 am.   [facility]:  Posting 3 beds. COVID negative test req. Lower acuity only [phone] Per call to Johanne @ 8:06 am, capped for aggression.     [facility]: Posting 8 beds. Low acuity. ECT Tx offered @ [facility] ONLY   [phone] (see above)   [facility]: Posting 4 beds. Low acuity only. (see above)   [facility]:  Posting 2 beds. Low acuity only. No current aggression. @ cap until tomorrow  [place]:  Posting 1 beds. NO reviews after 10PM. Low acuity only.    [place]: Posting 5 beds. No hx of aggression, sexual offense. [phone]     [facility]:  Posting 2 beds. Low acuity only. Must have the cognitive ability to do programming. No aggressive or violent behavior or recent HX in the last 2 yrs. ECT Tx Offered [phone]   [facility]: Posting 0 beds. COVID negative test. Must be low acuity ONLY. [phone]    [facility]: Posting 3 beds. Low acuity. Negative Covid. ECT Tx Offered [phone]. Per Adriana @ 9:01 AM, no beds today.   [facility]: Posting 4 beds. Covid neg. Voluntary only. Mixed unit of adults & adol. No aggressive or violent behavior. No registered sex offenders. [phone]    [place] [facility]: Posting 1 bed. COVID negative test. [phone] Per call to Julio @ 8:42 am.   [facility]: Posting 2 beds. No hx of aggression/assault. No  lines, drains or tubes. Does not provide detox or CD treatment. Must have a ride home. [phone] Per call to Fidel @ 8:44 am, 2 beds avail.   [place] [facility]: Posting 5 beds. Mixed unit/Low acuity/no medical devices - IV, CPAP etc. Negative Covid. [phone]  Per call to Ania @ 8:46 am, 3 beds avail, but referrals pending.   [place] [facility]: Posting 16 beds. Out-of-State Facility. [phone] Per call to Tammy @ 7:49 am, 12 beds avail.       Pt remains on waitlist pending appropriate placement availability.       10:03 AM Per BRISA Apple @ [facility], pt is willing to go as far as needed for placement.   10:08 Per call with Jamie with [facility], [facility] is able to review pt for admission.   10:26 AM Clinical faxed for review for Scott.   6:18 PM Per Tiana Chavez, pt was declined d/t their level of care not being appropriate for pt.

## 2024-02-25 NOTE — ED NOTES
Pt given warm blankets.  Given toothbrush and toothpaste.  Pt has water to drink at bedside. Pt denying suicidal or homicidal ideations.  Denies hallucinations.  Pt requested screening for STDs.  Denies symptoms or recent exposure.
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
## 2024-02-25 NOTE — ED PROVIDER NOTES
EMERGENCY DEPARTMENT ENCOUNTER      NAME: Fiona Mak  AGE: 28 year old female  YOB: 1995  MRN: [id number]  EVALUATION DATE & TIME: 2/24/2024 11:47 PM    PCP: [facility]    ED PROVIDER: Sagar Pedersen M.D.      Chief Complaint   Patient presents with    Mental Health Problem         FINAL IMPRESSION:  1. Acute psychosis (H)          ED COURSE & MEDICAL DECISION MAKING:    Pertinent Labs & Imaging studies reviewed. (See chart for details)  28 year old female presents to the Emergency Department for evaluation of mental health concern.  Patient has been homeless.  Apparently was abroad [place] [employer].  She has nowhere to go here.  Has been seen by multiple ERs.  Does seem to have a psychotic component.  Seems to be paranoid.  Difficult to assess.  Was seen by the DEC .  After long discussion with them patient states she would like to be admitted for mental health issues.  Has declined this at previous hospitals.  Did attempt to get her to crisis however she did not want that.  At this time patient will be admitted for mental health.  Will be signed out to the oncoming physician pending admission    12:23 AM I met with the patient to gather history and to perform my initial exam. I discussed the plan for care while in the Emergency Department.     At the conclusion of the encounter I discussed the results of all of the tests and the disposition. The questions were answered. The patient or family acknowledged understanding and was agreeable with the care plan.     Medical Decision Making  Obtained supplemental history:Supplemental history obtained?: Documented in chart  Reviewed external records: External records reviewed?: Documented in chart and Other: [facility] ED from 2/24/24  Care impacted by chronic illness:Mental Health  Care significantly affected by social determinants of health:Access to Medical Care  Did you consider but not  "order tests?: Work up considered but not performed and documented in chart, if applicable  Did you interpret images independently?: Independent interpretation of ECG and images noted in documentation, when applicable.  Consultation discussion with other provider:Did you involve another provider (consultant, , pharmacy, etc.)?: No  Admission considered. Patient was signed out to the oncoming physician, disposition pending.    MEDICATIONS GIVEN IN THE EMERGENCY:  Medications   ibuprofen (ADVIL/MOTRIN) tablet 600 mg (600 mg Oral $Given 24 0633)       NEW PRESCRIPTIONS STARTED AT TODAY'S ER VISIT  New Prescriptions    No medications on file          =================================================================    HPI    Patient information was obtained from: Patient    Use of : N/A      Fiona Mak is a 28 year old female with a pertinent history of ADHD who presents to this ED for a mental health evaluation and foot pain.    The patient reports here stating she is \"at a breaking point mentally\".  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  She does not currently have a place to stay here and denies having any family in the area that she can stay with.  She reports feeling increased anxiety and stress as she has been staying at the airport and on public transportation. She reports thinking about death more the last few days but denies any plan.  She states \"if I  it maybe wouldn't be the worst thing\".  She reports feeling vulnerable and is unsure what the plan is since she came back from [facility] and is waiting on an email from [employer]. She is reporting foot pain R>L, and states she has been on her feet more because she has not had anywhere to stay.  She is not currently on medication but was previously on medication for ADHD.  No other complaints at this time.    Per chart review, the patient was seen at [facility] ED on 24 " "for foot pain.  She had her feet examined and was given resources as she did not have a place to stay.\    PAST MEDICAL HISTORY:  No past medical history on file.    PAST SURGICAL HISTORY:  No past surgical history on file.        CURRENT MEDICATIONS:    No current facility-administered medications for this encounter.     No current outpatient medications on file.         ALLERGIES:  No Known Allergies    FAMILY HISTORY:  No family history on file.    SOCIAL HISTORY:   Social History     Socioeconomic History    Marital status: Single   Tobacco Use    Smoking status: Never    Smokeless tobacco: Never    Tobacco comments:     non smoking home   Substance and Sexual Activity    Alcohol use: Not Currently    Drug use: Not Currently       VITALS:  /66   Pulse (!) 122   Temp 97.7  F (36.5  C) (Oral)   Resp 18   Ht 1.575 m (5' 2\")   Wt 74.4 kg (164 lb)   SpO2 99%   BMI 30.00 kg/m      PHYSICAL EXAM    Physical Exam  Constitutional:       General: She is not in acute distress.     Appearance: She is not diaphoretic.   HENT:      Head: Atraumatic.      Mouth/Throat:      Pharynx: No oropharyngeal exudate.   Eyes:      General: No scleral icterus.     Pupils: Pupils are equal, round, and reactive to light.   Cardiovascular:      Heart sounds: Normal heart sounds.   Pulmonary:      Effort: No respiratory distress.      Breath sounds: Normal breath sounds.   Abdominal:      Palpations: Abdomen is soft.      Tenderness: There is no abdominal tenderness. There is no guarding or rebound.   Musculoskeletal:         General: No tenderness.   Skin:     General: Skin is warm.      Findings: No rash.   Neurological:      General: No focal deficit present.      Mental Status: She is alert.           LAB:  All pertinent labs reviewed and interpreted.  Labs Ordered and Resulted from Time of ED Arrival to Time of ED Departure - No data to display    RADIOLOGY:  Reviewed all pertinent imaging. Please see official radiology " report.  No orders to display     PROCEDURES:   None      I, Brien Corcoran, am serving as a scribe to document services personally performed by Dr. Sagar Pedersen, based on my observation and the provider's statements to me. I, Sagar Pedersen MD attest that Brien Corcoran is acting in a scribe capacity, has observed my performance of the services and has documented them in accordance with my direction.    Sagar Pedersen M.D.  Emergency Medicine  [facility] EMERGENCY ROOM  [address] [address]  [phone]  Dept: [phone]      Sagar Pedersen MD  02/25/24 0645

## 2024-02-25 NOTE — ED PROVIDER NOTES
[facility] EMERGENCY ROOM   ED Mental Health Observation - Initiation Note    Fiona Mak was placed into observation at 6:45 AM on 2/25/2024 for Psychosis.   Patient is expected to be under observation status for a minimum of eight hours.    MD Nuvia Benavidez Benjamin J, MD  02/25/24 0644

## 2024-02-25 NOTE — ED NOTES
Talked with Scott mcintosh RN and they currently have beds available, but will first need a alcohol, salicylate, tylenol and pregnancy test results faxed to them and then their psychiatrist will review if patient can be admitted there.
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
## 2024-02-26 ENCOUNTER — TELEPHONE (OUTPATIENT)
Dept: BEHAVIORAL HEALTH | Facility: CLINIC | Age: 29
End: 2024-02-26
Payer: COMMERCIAL

## 2024-02-26 VITALS
BODY MASS INDEX: 30.18 KG/M2 | TEMPERATURE: 98.2 F | HEART RATE: 90 BPM | HEIGHT: 62 IN | SYSTOLIC BLOOD PRESSURE: 107 MMHG | RESPIRATION RATE: 16 BRPM | DIASTOLIC BLOOD PRESSURE: 64 MMHG | WEIGHT: 164 LBS | OXYGEN SATURATION: 97 %

## 2024-02-26 LAB
C TRACH DNA SPEC QL PROBE+SIG AMP: NEGATIVE
N GONORRHOEA DNA SPEC QL NAA+PROBE: NEGATIVE

## 2024-02-26 PROCEDURE — 99244 OFF/OP CNSLTJ NEW/EST MOD 40: CPT | Performed by: REGISTERED NURSE

## 2024-02-26 PROCEDURE — 250N000013 HC RX MED GY IP 250 OP 250 PS 637: Performed by: EMERGENCY MEDICINE

## 2024-02-26 RX ORDER — LORAZEPAM 1 MG/1
1 TABLET ORAL ONCE
Status: COMPLETED | OUTPATIENT
Start: 2024-02-26 | End: 2024-02-26

## 2024-02-26 RX ORDER — ACETAMINOPHEN 325 MG/1
650 TABLET ORAL EVERY 4 HOURS PRN
Status: DISCONTINUED | OUTPATIENT
Start: 2024-02-26 | End: 2024-02-26 | Stop reason: HOSPADM

## 2024-02-26 RX ORDER — IBUPROFEN 400 MG/1
400 TABLET, FILM COATED ORAL EVERY 6 HOURS PRN
Status: DISCONTINUED | OUTPATIENT
Start: 2024-02-26 | End: 2024-02-26 | Stop reason: HOSPADM

## 2024-02-26 RX ADMIN — BENZOCAINE AND MENTHOL 1 LOZENGE: 15; 3.6 LOZENGE ORAL at 06:21

## 2024-02-26 RX ADMIN — IBUPROFEN 400 MG: 400 TABLET, FILM COATED ORAL at 10:22

## 2024-02-26 RX ADMIN — ACETAMINOPHEN 650 MG: 325 TABLET ORAL at 00:55

## 2024-02-26 RX ADMIN — LORAZEPAM 1 MG: 1 TABLET ORAL at 10:18

## 2024-02-26 RX ADMIN — ACETAMINOPHEN 650 MG: 325 TABLET ORAL at 09:09

## 2024-02-26 RX ADMIN — BENZOCAINE AND MENTHOL 1 LOZENGE: 15; 3.6 LOZENGE ORAL at 10:23

## 2024-02-26 ASSESSMENT — ACTIVITIES OF DAILY LIVING (ADL)
ADLS_ACUITY_SCORE: 35

## 2024-02-26 NOTE — ED NOTES
Pt is resting quietly in stretcher with lights dimmed and blanket over her face; 1:1 sitter is present.

## 2024-02-26 NOTE — TELEPHONE ENCOUNTER
No appropriate beds are currently available within the  system. Bed search update @ 12:45AM:         [facility]: @ cap per website. Per Penny @ 00:50, they are full and have a wait list    [facility]: @ cap per website   [facility]: @ cap per website. Per Марина @ 00:51, they are short providers and suggests calling back in the AM   [facility]: @ cap per website   [facility]: @ cap per website   [facility]: @ cap per website (Young Adult unit: No recent aggressions, violence or sexual assault. Neg covid required).  Pt not age appropriate  [facility]: @ cap per website   [facility]: @ cap per website   [facility]: Posting 2 beds. Per Alesha @ 01:35, at full capacity until 10AM    [facility]: Posting 2 beds. Mixed unit/Low acuity only. Declined 2/25 d/t their level of care not being appropriate for pt  [facility]: Posting 1 bed.  Per Alesha @ 01:35, at full capacity until 10AM    [facility]: @ cap per website   [facility]: Posting 2 beds.  Per Alesha @ 01:35, at full capacity until 10AM  [facility]: Does not review after 10PM.     [facility]: Posting beds at all locations. Per [place] @ 01:38, [facility] is capped; [facility] has 1 low-mod acuity bed but possibly reviewing for this bed; [facility] had very low acuity/mood disorder beds available. Called again @ 04:44 and Azalia wants to ensure [facility] is able to accommodate Influena + patients and WCB. Azalia called back @ 05:20 and they are unable to accept pt if she is symptomatic and has a fever. Additionally, they only have a male bed left  [facility]: Posting 6 beds (No hx of aggression. No sexual offenders. Voluntary patients only). Per Delilah @ 05:26, she is able to review. Faxed clinical   [facility]: Posting 1 bed (Always low acuity only. Must have the cognitive ability to do programming. No aggressive or violent behavior or recent HX in the last 2 yrs. MH must be primary).    [facility]: @ cap per website   [facility]: Posting 3 beds (Low acuity, Neg Covid). Per Niya @ 02:03, no beds tonight and they have a wait list  [facility]: Posting 4 beds (Covid neg. Voluntary only. Mixed unit. No aggressive or violent behavior. No registered sex offenders).   [facility]: Posting 2 beds (No hx of aggression/assault. No lines, drains or tubes. Does not provide detox or CD treatment. Must have confirmed ride home). Per Albin @ 02:33, they have beds  [facility]: Posting 8 beds. Facility is in [place].   [facility]: Posting 4 beds (Mixed unit/Low acuity. Must have confirmed ride home).  Per Nany @ 02:33, they only have low acuity beds and only 1 F high acuity bed        Pt remains on work list until appropriate placement is available    Awaiting callback from Abril Alvarez

## 2024-02-26 NOTE — ED PROVIDER NOTES
"[facility] EMERGENCY ROOM   ED Mental Health Observation - Discharge Note (Complete)    Fiona Mak is boarding in the ED after undergoing evaluation for Psychosis  Please see the initial H&P for this patient's presentation, workup, and disposition plan.    Interim History:  There were no significant events since the last note    Physical Exam:  /60 (BP Location: Left arm)   Pulse 82   Temp 98.2  F (36.8  C) (Oral)   Resp 16   Ht 1.575 m (5' 2\")   Wt 74.4 kg (164 lb)   SpO2 97%   BMI 30.00 kg/m    No respiratory distress, on room air   Well perfused  Behavior appropriate    Medications provided prior to my care:  Medications   acetaminophen (TYLENOL) tablet 650 mg (650 mg Oral $Given 2/26/24 0909)   ibuprofen (ADVIL/MOTRIN) tablet 400 mg (400 mg Oral $Given 2/26/24 1022)   benzocaine-menthol (CEPACOL) 15-3.6 MG lozenge 1 lozenge (1 lozenge Buccal $Given 2/26/24 1023)   ibuprofen (ADVIL/MOTRIN) tablet 600 mg (600 mg Oral $Given 2/25/24 0633)   acetaminophen (TYLENOL) tablet 650 mg (650 mg Oral $Given 2/25/24 1632)   ibuprofen (ADVIL/MOTRIN) tablet 600 mg (600 mg Oral $Given 2/25/24 2218)   LORazepam (ATIVAN) tablet 1 mg (1 mg Oral $Given 2/26/24 1018)       Laboratory (reviewed and interpreted):  Labs Ordered and Resulted from Time of ED Arrival to Time of ED Departure   URINE DRUG SCREEN PANEL - Abnormal       Result Value    Amphetamines Urine Screen Negative      Barbituates Urine Screen Negative      Benzodiazepine Urine Screen Negative      Cannabinoids Urine Screen Positive (*)     Cocaine Urine Screen Negative      Fentanyl Qual Urine Screen Negative      Opiates Urine Screen Negative      PCP Urine Screen Negative     ACETAMINOPHEN LEVEL - Abnormal    Acetaminophen <5.0 (*)    INFLUENZA A/B, RSV, & SARS-COV2 PCR - Abnormal    Influenza A PCR Negative      Influenza B PCR Positive (*)     RSV PCR Negative      SARS CoV2 PCR Negative     BASIC METABOLIC PANEL - " Normal    Sodium 137      Potassium 4.0      Chloride 104      Carbon Dioxide (CO2) 26      Anion Gap 7      Urea Nitrogen 12.6      Creatinine 0.65      GFR Estimate >90      Calcium 9.3      Glucose 82     COVID-19 VIRUS (CORONAVIRUS) BY PCR - Normal    SARS CoV2 PCR Negative     ETHYL ALCOHOL LEVEL - Normal    Alcohol ethyl <0.01     SALICYLATE LEVEL - Normal    Salicylate <0.3     HCG QUALITATIVE PREGNANCY - Normal    hCG Serum Qualitative Negative     GROUP A STREPTOCOCCUS PCR THROAT SWAB - Normal    Group A strep by PCR Not Detected     CBC WITH PLATELETS AND DIFFERENTIAL    WBC Count 7.1      RBC Count 4.19      Hemoglobin 12.7      Hematocrit 38.8      MCV 93      MCH 30.3      MCHC 32.7      RDW 13.4      Platelet Count 269      % Neutrophils 46      % Lymphocytes 44      % Monocytes 7      % Eosinophils 3      % Basophils 0      % Immature Granulocytes 0      NRBCs per 100 WBC 0      Absolute Neutrophils 3.3      Absolute Lymphocytes 3.1      Absolute Monocytes 0.5      Absolute Eosinophils 0.2      Absolute Basophils 0.0      Absolute Immature Granulocytes 0.0      Absolute NRBCs 0.0     CHLAMYDIA TRACHOMATIS/NEISSERIA GONORRHOEAE BY PCR       ED Course:  I discussed case with DEC , ED charge RN  I rounded on the patient    Impression/Plan:  Plan of care discussed at daily meeting; escalation of care considered.   Continue current plan for mental health treatment and placement, please see DEC  note for further details.    Upon reevaluation and discussion with DEC , we believe patient has shown insufficient improvement and thus will be Transferred.    EMERGENCY DEPARTMENT OBSERVATION status ended at 11:33 AM 2/26/2024    Discharge Management Time: > or equal to 30 minutes    1. Acute psychosis (H)    2. Influenza B        MD Arnulfo John Scott E., MD  02/26/24 1135

## 2024-02-26 NOTE — ED NOTES
Writer spoke with DEC , patient told DEC  that she wants to jump off a bridge or freeze to death.     Patient placed on 1:1, placed in behavioral scrubs and room made safe

## 2024-02-26 NOTE — ED NOTES
EMERGENCY DEPARTMENT SIGN OUT NOTE    Brief note:  During my admission she was going to go to [facility] but per nursing apparently they did not feel patient needed admission  DEC  reassessed her and still feels she needs psychiatric admission and is now having more suicidal ideation, DEC is going to update their assessment for admission  Patient complaining of a sore throat and wanted swabs done. She ended up testing positive for influenza.   Signed out to Dr. Pedersen at end of shift    Vanessa Gordon MD  [facility] EMERGENCY ROOM  [address] [place] [id number] [phone]       Vanessa Gordon MD  02/25/24 1827

## 2024-02-26 NOTE — ED NOTES
Pt has questions about placement; this writer let her know that I hadn't heard anything over this shift but that hopefully we would get some updates during the day/weekday. She expressed understanding, is also requesting something to help with her throat pain. MD updated.

## 2024-02-26 NOTE — ED PROVIDER NOTES
"[facility] EMERGENCY ROOM   ED Mental Health Observation - Daily Note for 2/26/2024    Fiona Mak is a 28 year old female currently boarding in the ED while awaiting placement for Psychosis.  Please see the initial H&P for this patient's presentation, workup, and disposition plan.     Hold Status:  Patient is Voluntary, but holdable and Voluntary (NOT holdable)    Plan:  In brief, the patient's presentation is notable for acute psychosis.   Patient is awaiting Mental health placement    Interim History:  There were significant events since last note.    Physical Exam:  /60 (BP Location: Left arm)   Pulse 82   Temp 98.2  F (36.8  C) (Oral)   Resp 16   Ht 1.575 m (5' 2\")   Wt 74.4 kg (164 lb)   SpO2 97%   BMI 30.00 kg/m    No respiratory distress, on room air   Well perfused  Behavior appropriate    Medications provided prior to my care:  Medications   acetaminophen (TYLENOL) tablet 650 mg (650 mg Oral $Given 2/26/24 0909)   ibuprofen (ADVIL/MOTRIN) tablet 400 mg (has no administration in time range)   benzocaine-menthol (CEPACOL) 15-3.6 MG lozenge 1 lozenge (1 lozenge Buccal $Given 2/26/24 0621)   LORazepam (ATIVAN) tablet 1 mg (has no administration in time range)   ibuprofen (ADVIL/MOTRIN) tablet 600 mg (600 mg Oral $Given 2/25/24 0633)   acetaminophen (TYLENOL) tablet 650 mg (650 mg Oral $Given 2/25/24 1632)   ibuprofen (ADVIL/MOTRIN) tablet 600 mg (600 mg Oral $Given 2/25/24 2218)       Laboratory (reviewed and interpreted):  Labs Ordered and Resulted from Time of ED Arrival to Time of ED Departure   URINE DRUG SCREEN PANEL - Abnormal       Result Value    Amphetamines Urine Screen Negative      Barbituates Urine Screen Negative      Benzodiazepine Urine Screen Negative      Cannabinoids Urine Screen Positive (*)     Cocaine Urine Screen Negative      Fentanyl Qual Urine Screen Negative      Opiates Urine Screen Negative      PCP Urine Screen Negative   "   ACETAMINOPHEN LEVEL - Abnormal    Acetaminophen <5.0 (*)    INFLUENZA A/B, RSV, & SARS-COV2 PCR - Abnormal    Influenza A PCR Negative      Influenza B PCR Positive (*)     RSV PCR Negative      SARS CoV2 PCR Negative     BASIC METABOLIC PANEL - Normal    Sodium 137      Potassium 4.0      Chloride 104      Carbon Dioxide (CO2) 26      Anion Gap 7      Urea Nitrogen 12.6      Creatinine 0.65      GFR Estimate >90      Calcium 9.3      Glucose 82     COVID-19 VIRUS (CORONAVIRUS) BY PCR - Normal    SARS CoV2 PCR Negative     ETHYL ALCOHOL LEVEL - Normal    Alcohol ethyl <0.01     SALICYLATE LEVEL - Normal    Salicylate <0.3     HCG QUALITATIVE PREGNANCY - Normal    hCG Serum Qualitative Negative     GROUP A STREPTOCOCCUS PCR THROAT SWAB - Normal    Group A strep by PCR Not Detected     CBC WITH PLATELETS AND DIFFERENTIAL    WBC Count 7.1      RBC Count 4.19      Hemoglobin 12.7      Hematocrit 38.8      MCV 93      MCH 30.3      MCHC 32.7      RDW 13.4      Platelet Count 269      % Neutrophils 46      % Lymphocytes 44      % Monocytes 7      % Eosinophils 3      % Basophils 0      % Immature Granulocytes 0      NRBCs per 100 WBC 0      Absolute Neutrophils 3.3      Absolute Lymphocytes 3.1      Absolute Monocytes 0.5      Absolute Eosinophils 0.2      Absolute Basophils 0.0      Absolute Immature Granulocytes 0.0      Absolute NRBCs 0.0     CHLAMYDIA TRACHOMATIS/NEISSERIA GONORRHOEAE BY PCR       ED Course:  7:20 AM I met with the patient and discussed plan with care team.   9:40 AM I noted that the patient tested positive for Influenza B yesterday, working on symptom management.   9:52 AM Extended Care Team reassessed the patient. Will discuss medication options later today. Plan to [place] back.     11:30 AM  Patient has been accepted at a mental health facility and will be transferred to [facility] in [facility] for direct mental health admission.  They are aware of her influenza B status and are comfortable  with that diagnosis in addition to her mental health condition anticipate transport once the patient's EMS is available.  Will update if any change in clinical status    Impression/Plan:  1. Acute psychosis (H)      I, Gilda Hernández, am serving as a scribe to document services personally performed by Dr. Arredondo based on my observation and the provider's statements to me. I, Stewart Arredondo MD, attest that Gilda Hernández is acting in a scribe capacity, has observed my performance of the services and has documented them in accordance with my direction.     Stewart Arredondo MD  2/26/2024 9:52 AM        Stewart Arredondo MD  02/26/24 1133

## 2024-02-26 NOTE — PROGRESS NOTES
"Triage & Transition Services, Extended Care     Therapy Progress Note    Patient: Fiona goes by \"Fiona,\" uses she/her pronouns  Date of Service: February 26, 2024  Site of Service: [facility] EMERGENCY ROOM                             [id number]  Patient was seen yes  Mode of Assessment: Virtual: AmWell    Presentation Summary: Pt reported high anxiety.  She stated that she doesn't know if she is going to be able to get in any help.  She reported feeling hopeless and helpless.  Pt stated that she feels exhausted and has suffocating panic.  She reported her current depression at a 10.  She stated that she is having passive suicidal thoughts.  She reported wondering if it would be so bad if she ended it.  She stated that she wants everything to stop and is questioning if she wants to live or not  Pt denied current plan and intent for suicide.  Pt stated that she doesn't have any coping skills.  She requested handouts on skills to manage anxiety.    Therapeutic Intervention(s) Provided: Identified and practiced coping skills.    Current Symptoms: excessive worry, shortness of breath or racing heart, anxious crying or feels like crying, helplessness, hoplessness, thoughts of death/suicide          Mental Status Exam   Affect: Blunted  Appearance: Appropriate  Attention Span/Concentration: Attentive  Eye Contact: Engaged    Fund of Knowledge: Appropriate   Language /Speech Content: Fluent  Language /Speech Volume: Normal  Language /Speech Rate/Productions: Normal  Recent Memory: Intact  Remote Memory: Variable  Mood: Depressed, Anxious  Orientation to Person: Yes   Orientation to Place: Yes  Orientation to Time of Day: Yes  Orientation to Date: Yes     Situation (Do they understand why they are here?): Yes  Psychomotor Behavior: Normal  Thought Content: Paranoia, Suicidal  Thought Form: Obsessive/Perseverative    Treatment Objective(s) Addressed: identifying and practicing coping strategies, " "processing feelings, assessing safety    Patient Response to Interventions: acceptance expressed    Progress Towards Goals: Patient Reports Symptoms Are: ongoing  Patient Progress Toward Goals: is not making progress  Comment: Pt continues to have high anxiety and depression.  She has suicidal thoughts, but with no plan or intent at this time.  Next Step to Work Toward Discharge: symptom stabilization    Case Management:  Josefa Mosley completed psychiatric assessment and supported inpatient mental health.    Plan: inpatient mental health  yes provider, BRISA Dubon  yes    Clinical Substantiation: It is the recommendation of this clinician that pt admit to  MH for safety and stabilization. Pt displays the following risk factors that support IP admission: suicidal ideation with a plan. Pt reports feeling very hopeless about her situation and she has considered jumping off a bridge or freezing to death. She is homeless and is having difficulty functioning in the community. She reports feeling paranoid about others in the community. She reports having tactile hallucinations in crowded places including crawling sensations on her head and back. She does not feel safe and states that she has thought about locations where she would die by suicide. Pt reports drinking a bottle of wine a couple of days ago, which is abnormal for her. Regarding alcohol use, she said \"if I'm going to die, I'm going to die anyhow.\"  Pt is unable to engage in safety planning to mitigate risk level in a non-secure setting. She is not currently prescribed medication and is not working with outpatient support.  IP is the least restrictive option of care for pt. Pt should remain in IP until deemed safe to return to the community and engage in [facility] supports.    Legal Status: Legal Status at Admission: Voluntary/Patient has signed consent for treatment    Session Status: Time session started: 0916  Time session ended: 0934  Session Duration " (minutes): 18 minutes  Session Number: 1  Anticipated number of sessions or this episode of care: 3    Time Spent: 18 minutes    CPT Code: CPT Codes: 95832 - Psychotherapy (with patient) - 30 (16-37*) min    Diagnosis:   Patient Active Problem List   Diagnosis Code    Psychosis (H) F29    Anxiety F41.9    Depression, reactive F32.9       Primary Problem This Admission: Active Hospital Problems    Anxiety      *Depression, reactive        Barbara Mas LP   Licensed Mental Health Professional (LMHP), [facility]  [phone]
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
## 2024-02-26 NOTE — ED NOTES
Complaining of sore throat. Rates 7/10. Tylenol and lozenge administered. Pt verbalizes feeling increased anxiety and feeling hopeless. States she does not feel suicidal and has taken xanax in the past. MD updated. New order received.

## 2024-02-27 NOTE — CONSULTS
"      Initial Psychiatric Consult   Consult date: February 26, 2024    This writer met with patient today to discuss recurrent anxiety and recommendations of disposition.     Per DEC assessment: It is the recommendation of this clinician that pt admit to IP MH for safety and stabilization. Pt displays the following risk factors that support IP admission: suicidal ideation with a plan. Pt reports feeling very hopeless about her situation and she has considered jumping off a bridge or freezing to death. She is homeless and is having difficulty functioning in the community. She reports feeling paranoid about others in the community. She reports having tactile hallucinations in crowded places including crawling sensations on her head and back. She does not feel safe and states that she has thought about locations where she would die by suicide. Pt reports drinking a bottle of wine a couple of days ago, which is abnormal for her. Regarding alcohol use, she said \"if I'm going to die, I'm going to die anyhow.\"  Pt is unable to engage in safety planning to mitigate risk level in a non-secure setting. She is not currently prescribed medication and is not working with outpatient support.  IP is the least restrictive option of care for pt. Pt should remain in IP until deemed safe to return to the community and engage in [facility] supports.       Patient reports that she \"Is having difficulty coping and doesn't know what to do.\" She shares that she is not sure she can cope without assistance from inpatient psychiatry. She reports feeling helpless, hopeless and overwhelmed. Patient has not been to inpatient psychiatry in the past. She reports that she does not feel safe and might not keep herself safe. I discussed case with Extended Care team and we are in agreement that patient would benefit from inpatient psychiatry. Patient is agreeable to voluntary inpatient psychiatry.        Page me or re-consult psychiatry as needed. "       Josefa Mosley, ISELA, APRN  Consult/Liaison Psychiatry  [facility]   Contact information available via [facility] Paging/Directory.  If I am not available, please call [facility] intake ([phone])

## 2024-08-02 ENCOUNTER — TELEPHONE (OUTPATIENT)
Dept: BEHAVIORAL HEALTH | Facility: HOSPITAL | Age: 29
End: 2024-08-02

## 2024-08-02 ENCOUNTER — HOSPITAL ENCOUNTER (INPATIENT)
Age: 29
End: 2024-08-02
Admitting: PSYCHIATRY & NEUROLOGY
Payer: COMMERCIAL
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
## 2024-08-13 ENCOUNTER — HOSPITAL ENCOUNTER (INPATIENT)
Facility: HOSPITAL | Age: 29
LOS: 1 days | Discharge: OTHER INSTITUTION | DRG: 882 | End: 2024-08-14
Attending: NURSE PRACTITIONER | Admitting: STUDENT IN AN ORGANIZED HEALTH CARE EDUCATION/TRAINING PROGRAM
Payer: COMMERCIAL

## 2024-08-13 VITALS
SYSTOLIC BLOOD PRESSURE: 99 MMHG | RESPIRATION RATE: 16 BRPM | OXYGEN SATURATION: 100 % | TEMPERATURE: 98.9 F | HEART RATE: 84 BPM | DIASTOLIC BLOOD PRESSURE: 68 MMHG

## 2024-08-13 DIAGNOSIS — F29 PSYCHOSIS, UNSPECIFIED PSYCHOSIS TYPE (H): ICD-10-CM

## 2024-08-13 PROBLEM — F33.2 MAJOR DEPRESSIVE DISORDER, RECURRENT SEVERE WITHOUT PSYCHOTIC FEATURES (H): Status: ACTIVE | Noted: 2024-02-27

## 2024-08-13 PROBLEM — F41.1 GENERALIZED ANXIETY DISORDER: Status: ACTIVE | Noted: 2024-02-27

## 2024-08-13 LAB
ALBUMIN UR-MCNC: 20 MG/DL
AMPHETAMINES UR QL SCN: ABNORMAL
APPEARANCE UR: CLEAR
BACTERIA #/AREA URNS HPF: ABNORMAL /HPF
BARBITURATES UR QL SCN: ABNORMAL
BENZODIAZ UR QL SCN: ABNORMAL
BILIRUB UR QL STRIP: NEGATIVE
BZE UR QL SCN: ABNORMAL
CANNABINOIDS UR QL SCN: ABNORMAL
COLOR UR AUTO: ABNORMAL
FENTANYL UR QL: ABNORMAL
GLUCOSE UR STRIP-MCNC: NEGATIVE MG/DL
HCG UR QL: NEGATIVE
HGB UR QL STRIP: ABNORMAL
HYALINE CASTS: 6 /LPF
KETONES UR STRIP-MCNC: NEGATIVE MG/DL
LEUKOCYTE ESTERASE UR QL STRIP: NEGATIVE
MUCOUS THREADS #/AREA URNS LPF: PRESENT /LPF
NITRATE UR QL: NEGATIVE
OPIATES UR QL SCN: ABNORMAL
PCP QUAL URINE (ROCHE): ABNORMAL
PH UR STRIP: 6.5 [PH] (ref 4.7–8)
RBC URINE: 1 /HPF
SP GR UR STRIP: 1.01 (ref 1–1.03)
SQUAMOUS EPITHELIAL: 2 /HPF
UROBILINOGEN UR STRIP-MCNC: NORMAL MG/DL
WBC URINE: 1 /HPF

## 2024-08-13 PROCEDURE — 99285 EMERGENCY DEPT VISIT HI MDM: CPT

## 2024-08-13 PROCEDURE — 81025 URINE PREGNANCY TEST: CPT | Performed by: NURSE PRACTITIONER

## 2024-08-13 PROCEDURE — 124N000001 HC R&B MH

## 2024-08-13 PROCEDURE — 81001 URINALYSIS AUTO W/SCOPE: CPT | Performed by: NURSE PRACTITIONER

## 2024-08-13 PROCEDURE — 250N000013 HC RX MED GY IP 250 OP 250 PS 637: Performed by: NURSE PRACTITIONER

## 2024-08-13 PROCEDURE — 99285 EMERGENCY DEPT VISIT HI MDM: CPT | Performed by: NURSE PRACTITIONER

## 2024-08-13 PROCEDURE — 80307 DRUG TEST PRSMV CHEM ANLYZR: CPT | Performed by: NURSE PRACTITIONER

## 2024-08-13 RX ORDER — LANOLIN ALCOHOL/MO/W.PET/CERES
3 CREAM (GRAM) TOPICAL
COMMUNITY
Start: 2024-03-08

## 2024-08-13 RX ORDER — HYDROXYZINE HYDROCHLORIDE 25 MG/1
25 TABLET, FILM COATED ORAL EVERY 4 HOURS PRN
Status: DISCONTINUED | OUTPATIENT
Start: 2024-08-13 | End: 2024-08-14 | Stop reason: HOSPADM

## 2024-08-13 RX ORDER — MAGNESIUM HYDROXIDE/ALUMINUM HYDROXICE/SIMETHICONE 120; 1200; 1200 MG/30ML; MG/30ML; MG/30ML
30 SUSPENSION ORAL EVERY 4 HOURS PRN
Status: DISCONTINUED | OUTPATIENT
Start: 2024-08-13 | End: 2024-08-14 | Stop reason: HOSPADM

## 2024-08-13 RX ORDER — HYDROXYZINE HYDROCHLORIDE 25 MG/1
25-50 TABLET, FILM COATED ORAL 3 TIMES DAILY PRN
Status: ON HOLD | COMMUNITY
End: 2024-08-14

## 2024-08-13 RX ORDER — LANOLIN ALCOHOL/MO/W.PET/CERES
3 CREAM (GRAM) TOPICAL
Status: DISCONTINUED | OUTPATIENT
Start: 2024-08-13 | End: 2024-08-14 | Stop reason: HOSPADM

## 2024-08-13 RX ORDER — ACETAMINOPHEN 325 MG/1
975 TABLET ORAL ONCE
Status: COMPLETED | OUTPATIENT
Start: 2024-08-13 | End: 2024-08-13

## 2024-08-13 RX ORDER — OLANZAPINE 10 MG/2ML
10 INJECTION, POWDER, FOR SOLUTION INTRAMUSCULAR 3 TIMES DAILY PRN
Status: DISCONTINUED | OUTPATIENT
Start: 2024-08-13 | End: 2024-08-14 | Stop reason: HOSPADM

## 2024-08-13 RX ORDER — OLANZAPINE 10 MG/1
10 TABLET ORAL 3 TIMES DAILY PRN
Status: DISCONTINUED | OUTPATIENT
Start: 2024-08-13 | End: 2024-08-14 | Stop reason: HOSPADM

## 2024-08-13 RX ORDER — ACETAMINOPHEN 325 MG/1
650 TABLET ORAL EVERY 4 HOURS PRN
Status: DISCONTINUED | OUTPATIENT
Start: 2024-08-13 | End: 2024-08-14 | Stop reason: HOSPADM

## 2024-08-13 RX ADMIN — MELATONIN 3 MG: at 21:49

## 2024-08-13 ASSESSMENT — ENCOUNTER SYMPTOMS
NEUROLOGICAL NEGATIVE: 1
GASTROINTESTINAL NEGATIVE: 1
AGITATION: 1
HEMATOLOGIC/LYMPHATIC NEGATIVE: 1
MUSCULOSKELETAL NEGATIVE: 1
CONSTITUTIONAL NEGATIVE: 1
NERVOUS/ANXIOUS: 1
CARDIOVASCULAR NEGATIVE: 1
ENDOCRINE NEGATIVE: 1
RESPIRATORY NEGATIVE: 1
EYES NEGATIVE: 1
ALLERGIC/IMMUNOLOGIC NEGATIVE: 1

## 2024-08-13 ASSESSMENT — ACTIVITIES OF DAILY LIVING (ADL)
ADLS_ACUITY_SCORE: 45
ADLS_ACUITY_SCORE: 45
ADLS_ACUITY_SCORE: 35
ADLS_ACUITY_SCORE: 45
ADLS_ACUITY_SCORE: 35
ADLS_ACUITY_SCORE: 28
ADLS_ACUITY_SCORE: 35
ADLS_ACUITY_SCORE: 45
ADLS_ACUITY_SCORE: 45

## 2024-08-13 NOTE — ED NOTES
Call to DEC, ETA between 3-4 pm    Pt requesting bible.  Will get this from 5th floor and provide to her.    SAMPSON Mckeon

## 2024-08-13 NOTE — ED NOTES
"Writer attempted to give patient 72 hours hold form, patient reports that she can't comprehend the paper work and I should not be providing here with paper work. Patient refusing blood draw at this time as \"she needs at her blood to stay in her body.\"     Security here to wand patient and sit 1:1, patient asked to change into hospital scrubs at this time.     Patient refusing to answer writer questions.   "

## 2024-08-13 NOTE — ED PROVIDER NOTES
"  History     Chief Complaint   Patient presents with    Psychiatric Evaluation     HPI  Fiona Mak is a 28 year old individual with history of anxiety, major depressive depression, ADD, comes in via law enforcement for psychiatric evaluation.  Patient apparently is homeless and stating that everybody is after her due to her being a \"Religious\".  States that everything she does \"lands her back into homelessness\".  Patient denies suicidal ideation but states \"the people who are persecuting me might as well kill me and get it done with\".    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 No pertinent past medical history.    Past Surgical History:    History reviewed. No pertinent surgical history.    Family History:    History reviewed. No pertinent family history.    Social History:  Marital Status:  Single [1]  Social History     Tobacco Use    Smoking status: Some Days     Types: Cigarettes    Smokeless tobacco: Never    Tobacco comments:     non smoking home   Substance Use Topics    Alcohol use: Not Currently    Drug use: Not Currently        Medications:    melatonin 3 MG tablet  hydrOXYzine HCl (ATARAX) 25 MG tablet          Review of Systems   Constitutional: Negative.    HENT: Negative.     Eyes: Negative.    Respiratory: Negative.     Cardiovascular: Negative.    Gastrointestinal: Negative.    Endocrine: Negative.    Genitourinary: Negative.  "   Musculoskeletal: Negative.    Skin: Negative.    Allergic/Immunologic: Negative.    Neurological: Negative.    Hematological: Negative.    Psychiatric/Behavioral:  Positive for agitation. Negative for self-injury and suicidal ideas. The patient is nervous/anxious.        Physical Exam   BP: 128/91  Pulse: (!) 125  Temp: 99.3  F (37.4  C)  Resp: 16  SpO2: 100 %      GENERAL APPEARANCE:  The patient is a 28 year old well-developed, well-nourished individual that appears as stated age.  LUNGS:  Breathing is easy.  Breath sounds are equal and clear bilaterally.  No wheezes, rhonchi, or rales.  HEART: Tachycardic rate with regular rhythm with normal S1 and S2.  No murmurs, gallops, or rubs.  NEUROLOGIC:  No focal sensory or motor deficits are noted.    PSYCHIATRIC:   Gait and Station: Normal  Psychomotor Behavior:  physical agitation  Attention Span and Concentration:  intact  Eye Contact:  good  Speech:  clear, coherent  Mood:  angry  Affect:  mood congruent, intensity is heightened, and guarded  Thought Process:  illogical  Thought Content:   Psychotic thought present  Oriented to:  time, person, and place  Recent and Remote Memory:  intact  Judgment:  poor  Attitude:  guarded  Insight:   Not assessed    SKIN:  Warm, dry, and well perfused.  Good turgor.  No lesions, nodules, or rashes are noted.  No bruising noted.  LYMPHATICS:  No supraclavicular, axillary, or groin adenopathy is noted.    Comment: Discrepancies between my note and notes on behalf of the nursing team or other care providers are secondary to my findings reflecting my physical examination and questioning of the patient.  Any conflicting information provided is not in line with my examination of the patient.       ED Course     ED Course as of 08/13/24 1559   Tue Aug 13, 2024   1300 In to see patient and history/physical completed.    1307 Labs and DEC ordered.   1345 Patient is refusing labs at this time.   1550 DEC  completed.  Inpatient  status recommended for stabilization.   1555 Discussed case with [place] psych provider Dr. Philippe.  Patient accepted for behavioral health admission in [place].               Results for orders placed or performed during the hospital encounter of 08/13/24 (from the past 24 hour(s))   UA with Microscopic reflex to Culture    Specimen: Urine, Midstream   Result Value Ref Range    Color Urine Light Yellow Colorless, Straw, Light Yellow, Yellow    Appearance Urine Clear Clear    Glucose Urine Negative Negative mg/dL    Bilirubin Urine Negative Negative    Ketones Urine Negative Negative mg/dL    Specific Gravity Urine 1.007 1.003 - 1.035    Blood Urine Small (A) Negative    pH Urine 6.5 4.7 - 8.0    Protein Albumin Urine 20 (A) Negative mg/dL    Urobilinogen Urine Normal Normal, 2.0 mg/dL    Nitrite Urine Negative Negative    Leukocyte Esterase Urine Negative Negative    Bacteria Urine Few (A) None Seen /HPF    Mucus Urine Present (A) None Seen /LPF    RBC Urine 1 <=2 /HPF    WBC Urine 1 <=5 /HPF    Squamous Epithelials Urine 2 (H) <=1 /HPF    Hyaline Casts Urine 6 (H) <=2 /LPF    Narrative    Urine Culture not indicated   HCG qualitative urine (UPT)   Result Value Ref Range    hCG Urine Qualitative Negative Negative   Urine Drug Screen    Narrative    The following orders were created for panel order Urine Drug Screen.  Procedure                               Abnormality         Status                     ---------                               -----------         ------                     Urine Drug Screen Panel[[id number]]      Abnormal            Final result                 Please view results for these tests on the individual orders.   Urine Drug Screen Panel   Result Value Ref Range    Amphetamines Urine Screen Negative Screen Negative    Barbituates Urine Screen Negative Screen Negative    Benzodiazepine Urine Screen Negative Screen Negative    Cannabinoids Urine Screen Positive (A) Screen Negative     "Cocaine Urine Screen Negative Screen Negative    Fentanyl Qual Urine Screen Negative Screen Negative    Opiates Urine Screen Negative Screen Negative    PCP Urine Screen Negative Screen Negative       Medications   acetaminophen (TYLENOL) tablet 975 mg (975 mg Oral Not Given 8/13/24 1354)       Assessments & Plan (with Medical Decision Making)     I have reviewed the nursing notes.    I have reviewed the findings, diagnosis, plan and need for follow up with the patient.    Summary:  Patient presents to the ER today psychiatric evaluation.  Potential diagnosis which have been considered and evaluated include suicidal, psychotic, alcohol intoxication, drug intoxication, metabolic encephalopathy, as well as others. Many of these have been excluded using the various modalities and assessment as noted on the chart. At the present time, the diagnosis given seems to be the most likely psychosis.  Upon arrival, vitals signs show blood pressure 128/91 with a pulse of 125.  Temperature 37.4  C.  Respirations 16 laxation 100% on room air.  The patient is alert but very guarded and agitated.  Patient denies suicidal ideation but states \"the people or persecuting me might as well just kill me and get it over with\".  Denies any alcohol use or drug use.  Patient at this time is very agitated and guarded with signs of psychosis.  Patient placed on psychiatric hold at this time.  Lab work ordered showing benign UA, cannabinoids and the drug screen, negative pregnancy.  Patient refused blood work.  DEC assessment was ordered and recommends inpatient admission for stabilization.  Discussed case with [place] psych provider Dr. Philippe.  Patient accepted for MHICU admission on [place] psych floor..        Critical Care Time: None     Impression and plan discussed with patient. Questions answered, concerns addressed, indications for urgent re-evaluation reviewed, and  given. Patient/Parent/Caregiver agree with treatment plan and " have no further questions at this time.      This note was created by the Dragon Voice Dictation System. Inadvertent typographical errors, due to software recognition problems, may still exist.             New Prescriptions    No medications on file       Final diagnoses:   Psychosis, unspecified psychosis type (H)       8/13/2024   [facility]       Sammy Harris APRN CNP  08/13/24 1984

## 2024-08-13 NOTE — ED NOTES
Patient requests tylenol for headache, attempted to give patient medications, she then spit medications out.    Patient requesting  consult, Tila in with patient at this time.     Continues to refuse lab draws.

## 2024-08-13 NOTE — CONSULTS
"Diagnostic Evaluation Consultation  Crisis Assessment    Patient Name: Fiona Mak  Age:  28 year old  Legal Sex: female  Gender Identity: female  Pronouns:   Race: Black or   Ethnicity: Not  or   Language: English      Patient was assessed: Virtual: [employer]   Crisis Assessment Start Date: 08/13/24  Crisis Assessment Start Time: 1519  Crisis Assessment Stop Time: 1534  Patient location: [facility]                             [id number]    Referral Data and Chief Complaint  Fiona Mak presents to the ED via police. Patient is presenting to the ED for the following concerns: Verbal agitation, Paranoia.   Factors that make the mental health crisis life threatening or complex are:  Presents via police from shelter - \"my face is a giant target wherever I go\".  Pt reports the other residents at the shelter are harassing her - that they are instigating drama. Pt identifies that she used to wear a cross, but she has taken it off because she is targeted for being Jainism - \"the people who are persecuting me might as well kill me and get it done with\". Thought content delusional - pt paranoid..      Informed Consent and Assessment Methods  Explained the crisis assessment process, including applicable information disclosures and limits to confidentiality, assessed understanding of the process, and obtained consent to proceed with the assessment.  Assessment methods included conducting a formal interview with patient, review of medical records, collaboration with medical staff, and obtaining relevant collateral information from family and community providers when available.  : done     Patient response to interventions: verbalizes understanding  Coping skills were attempted to reduce the crisis:  lacking     History of the Crisis   Hx of anxiety dx. Seen by [facility] on 8/1/24 for depression, paranoia, delusional thought content. No current MH care.    Brief Psychosocial " History  Family:  Single, Children no  Support System:     Employment Status:  unemployed  Source of Income:  unable to assess  Financial Environmental Concerns:  unemployed  Current Hobbies:     Barriers in Personal Life:  financial concerns    Significant Clinical History  Current Anxiety Symptoms:  racing thoughts, excessive worry, anxious  Current Depression/Trauma:  impaired decision making  Current Somatic Symptoms:  anxious, excessive worry, racing thoughts  Current Psychosis/Thought Disturbance:  hyperverbal, impulsive  Current Eating Symptoms:     Chemical Use History:  Alcohol: None  Benzodiazepines: None  Opiates: None  Cocaine: None  Marijuana: None  Other Use: None   Past diagnosis:  Anxiety Disorder  Family history:  No known history of mental health or chemical health concerns  Past treatment:  Individual therapy, Psychiatric Medication Management, Inpatient Hospitalization  Details of most recent treatment:  Hx of therapy & med mgmt, none currently - last took any medication in approx 2018. Hx of IP MH admission - unclear when last admitted.  Other relevant history:  Pt reports 4 pending legal charges - unclear the details.       Collateral Information  Is there collateral information: No     Collateral information name, relationship, phone number:       What happened today:       What is different about patient's functioning:       Concern about alcohol/drug use:      What do you think the patient needs:      Has patient made comments about wanting to kill themselves/others:      If d/c is recommended, can they take part in safety/aftercare planning:       Additional collateral information:        Risk Assessment  [place] Suicide Severity Rating Scale Full Clinical Version:  Suicidal Ideation  Q6 Suicide Behavior (Lifetime): no          [place] Suicide Severity Rating Scale Recent:   Suicidal Ideation (Recent)  Q1 Wished to be Dead (Past Month): no  Q2 Suicidal Thoughts (Past Month): no  Level of  Risk per Screen: no risks indicated          Environmental or Psychosocial Events: unstable housing, homelessness, impulsivity/recklessness, legal issues such as DWI, DUI, lawsuit, CPS involvement, etc., geographic isolation from supports, neither working nor attending school  Protective Factors: Protective Factors: cultural, spiritual , or Voodoo beliefs associated with meaning and value in life    Does the patient have thoughts of harming others? Feels Like Hurting Others: no  Previous Attempt to Hurt Others: no  Is the patient engaging in sexually inappropriate behavior?: no    Is the patient engaging in sexually inappropriate behavior?  no        Mental Status Exam   Affect: Blunted  Appearance: Appropriate  Attention Span/Concentration: Attentive  Eye Contact: Intense    Fund of Knowledge: Appropriate   Language /Speech Content: Fluent  Language /Speech Volume: Normal  Language /Speech Rate/Productions: Pressured  Recent Memory: Variable  Remote Memory: Variable  Mood: Anxious, Irritable  Orientation to Person: Yes   Orientation to Place: Yes  Orientation to Time of Day: Yes  Orientation to Date: Yes     Situation (Do they understand why they are here?): No  Psychomotor Behavior: Normal  Thought Content: Paranoia, Delusions  Thought Form: Obsessive/Perseverative     Mini-Cog Assessment  Number of Words Recalled:    Clock-Drawing Test:     Three Item Recall:    Mini-Cog Total Score:       Medication  Psychotropic medications:   Medication Orders - Psychiatric (From admission, onward)      None             Current Care Team  Patient Care Team:  [facility] as PCP - General  [facility]  Adam Johnson MD as MD (Family Practice)    Diagnosis  Patient Active Problem List   Diagnosis Code    Psychosis (H) F29    Anxiety F41.9    Depression, reactive F32.9    Attention deficit disorder F98.8    Generalized anxiety disorder F41.1    Major depressive  disorder, recurrent severe without psychotic features (H) F33.2    Psychosis, unspecified psychosis type (H) F29       Primary Problem This Admission  Active Hospital Problems    Psychosis, unspecified psychosis type (H)        Clinical Summary and Substantiation of Recommendations   Following pt interview, review of pt medical hx and consultation with ED provider - it is recommended that pt admit to IP MH unit at this time for her immediate safety and further eval of her psychotic sx.          Severe psychiatric, behavioral or other comorbid conditions are appropriate for management at inpatient mental health as indicated by at least one of the following: Impaired impulse control, judgement, or insight, Psychiatric Symptoms  Severe dysfunction in daily living is present as indicated by at least one of the following: Extreme deterioration in social interactions  Situation and expectations are appropriate for inpatient care: Patient management/treatment at lower level of care is not feasible or is inappropriate  Inpatient mental health services are necessary to meet patient needs and at least one of the following: Specific condition related to admission diagnosis is present and judged likely to deteriorate in absence of treatment at proposed level of care      Patient coping skills attempted to reduce the crisis:  lacking    Disposition  Recommended disposition: Inpatient Mental Health        Reviewed case and recommendations with attending provider. Attending Name: Steven KRAUSE CNP       Attending concurs with disposition: yes       Patient and/or validated legal guardian concurs with disposition:   yes       Final disposition:  inpatient mental health    Legal status on admission:  72hh    Assessment Details   Total duration spent with the patient: 15 min     CPT code(s) utilized: Non-Billable    GENOVEVA Kenny, Psychotherapist  DEC - Triage & Transition Services  Callback: [phone]

## 2024-08-13 NOTE — CONFIDENTIAL NOTE
Care Transitions focused note:      Met with patient who presented with law enforcement on a police hold.  She was at the [facility] shelter here in [place].  Apparently she was kicked out and law enforcement was called.  Previous to this she was at the [facility] and transferred to the shelter in [place].    Pt states she is going through spiritual warfare with [place] and she doesn't understand what is going on but feels people are out to harm her.  She has been unable to secure housing and expresses frustration with this.      She has pressured speech and tangential thinking.  Unable to really complete thoughts or answer a lot of specific questions.     States she doesn't have any family, answers some questions and then gets angry.  Feels she needs a mental health evaluation. Mentions she feels like she's dying but does not state she is suicidal.  Feels a lot of people in the world are not good.  Is willing to go to the mental health unit and I feel she would benefit from this at this time.  She is willing to complete a DEC assessment.    Will follow    SAMPSON Mckeon

## 2024-08-13 NOTE — ED NOTES
Patient not willing to answer questions or give her name upon arrival. Stated the police has her ID and God knew. Patient upset. States she feels people in the shelter have been threatening her and she is afraid to wear her cross. Feels it is because she is a Catholic. States she is not from this country and this is the first time being homeless. Admits that her address is in [place].Feels people are yelling at her, non-verbally- like a dog barking. She states she feels overwhelmed and over stimulated. Head throbs- like someone is pounding- at least 4 times a day. She states that she is not afraid to die. States that there are a lot of things triggering her, it is hard to explain. PD brought her in from the homeless shelter as she had refused to leave. Patient arrived in hand cuffs. Stated that the pain was excruciating due to other injuries to her shoulders. Patient has a flat affect.

## 2024-08-14 PROCEDURE — 99235 HOSP IP/OBS SAME DATE MOD 70: CPT | Mod: AI | Performed by: PSYCHIATRY & NEUROLOGY

## 2024-08-14 RX ORDER — FLUOXETINE 40 MG/1
40 CAPSULE ORAL DAILY
Status: ON HOLD | COMMUNITY
Start: 2024-04-12 | End: 2024-08-14

## 2024-08-14 RX ORDER — CETIRIZINE HYDROCHLORIDE 10 MG/1
1 TABLET ORAL DAILY
Status: ON HOLD | COMMUNITY
Start: 2024-05-08 | End: 2024-08-14

## 2024-08-14 RX ORDER — FLUTICASONE PROPIONATE 50 MCG
2 SPRAY, SUSPENSION (ML) NASAL DAILY
Status: ON HOLD | COMMUNITY
Start: 2024-04-05 | End: 2024-08-14

## 2024-08-14 ASSESSMENT — ACTIVITIES OF DAILY LIVING (ADL)
ADLS_ACUITY_SCORE: 28

## 2024-08-14 NOTE — DISCHARGE SUMMARY
"[facility] PSYCHIATRY   HISTORY AND PHYSICAL/DISCHARGE SUMMARY      ADMISSION/DISCHARGE DATA      Fiona Mak MRN# [id number]   Age: 28 year old YOB: 1995      Date of Admission: 8/13/2024  Date of Discharge: August 14, 2024  Discharge Provider: Andrew Philippe MD         CHIEF COMPLAINT   \"I need you to find me a place to live.\"         HISTORY OF PRESENT ILLNESS      Per ED, \"Fiona Mak is a 28 year old individual with history of anxiety, major depressive depression, ADD, comes in via law enforcement for psychiatric evaluation.  Patient apparently is homeless and stating that everybody is after her due to her being a \"Faith\".  States that everything she does \"lands her back into homelessness\".  Patient denies suicidal ideation but states \"the people who are persecuting me might as well kill me and get it done with\".\"     Per DEC assessment, \"bella Mak presents to the ED via police. Patient is presenting to the ED for the following concerns: Verbal agitation, Paranoia.   Factors that make the mental health crisis life threatening or complex are:  Presents via police from shelter - \"my face is a giant target wherever I go\".  Pt reports the other residents at the shelter are harassing her - that they are instigating drama. Pt identifies that she used to wear a cross, but she has taken it off because she is targeted for being Faith - \"the people who are persecuting me might as well kill me and get it done with\". Thought content delusional - pt paranoid..         On examination, she is met in her room. She is alert and oriented in all spheres. She states \"I need you to find me a place to live\". She states she finds it suspicious that she has a  and that person has not been able to secure a place that she finds suitable in over six months. She believes this is occurring because she is a Spiritism.  She states that it is \"bogus\" that people continue to call her mentally " "ill.  She does acknowledge that her current situation is stressful and she is offered medications to target anxiety, but states she will do things \"natrual\". She is not interested in participating in unit acitvities and does not feel she needs a therapist. She states that she wishes to sleep.  We does not wish to share with writer reasons for why she was previously hospitalized and again states that the hospital may as well be persecuting her as well.  Discussed we are not able to solve her housing crisis for her to which she states \"you better\".  She does not vocalize SI, no plna, no intent. Denies HI, no plan, no intent.  She is informed she will be discharged.          PSYCHIATRIC HISTORY      Hx of therapy & med mgmt, none currently - last took any medication in approx 2018. Hx of IP MH admission - unclear when last admitted.      4 pending legal charges - details unknown         SUBSTANCE USE HISTORY       History   Drug Use Unknown         Social History    Substance and Sexual Activity      Alcohol use: Not Currently             History   Smoking Status    Some Days    Types: Cigarettes   Smokeless Tobacco    Never      Hx of polysubstance abuse          SOCIAL HISTORY   Social History   Social History            Socioeconomic History    Marital status: Single       Spouse name: Not on file    Number of children: Not on file    Years of education: Not on file    Highest education level: Not on file   Occupational History    Not on file   Tobacco Use    Smoking status: Some Days       Types: Cigarettes    Smokeless tobacco: Never    Tobacco comments:       non smoking home   Substance and Sexual Activity    Alcohol use: Not Currently    Drug use: Not Currently    Sexual activity: Not on file   Other Topics Concern    Not on file   Social History Narrative    Not on file      Social Determinants of Health           Financial Resource Strain: High Risk (2/26/2024)     Received from [facility]     Overall Financial Resource Strain (CARDIA)      Difficulty of Paying Living Expenses: Very hard   Food Insecurity: Food Insecurity Present (8/2/2024)     Received from [facility]     Hunger Vital Sign      Worried About Running Out of Food in the Last Year: Often true      Ran Out of Food in the Last Year: Often true   Transportation Needs: Unmet Transportation Needs (8/2/2024)     Received from [facility]     PRAPARE - Transportation      Lack of Transportation (Medical): Yes      Lack of Transportation (Non-Medical): Yes   Physical Activity: Not on file   Stress: Not on file   Social Connections: Unknown (8/22/2023)     Received from [facility]     Social Connections      Frequency of Communication with Friends and Family: Not on file   Interpersonal Safety: Not At Risk (8/1/2024)     Received from [facility]     EH IP Custom IPV      Do you feel UNSAFE in any of your personal relationships with your family members or any other acquaintances?: No   Housing Stability: High Risk (8/2/2024)     Received from [facility]     Housing Stability Vital Sign      Unable to Pay for Housing in the Last Year: Yes      Number of Times Moved in the Last Year: 3      Homeless in the Last Year: Yes         Homeless, will not share other details         FAMILY HISTORY   Family History   History reviewed. No pertinent family history.          PAST MEDICAL HISTORY   Past Medical History   History reviewed. No pertinent past medical history.        Past Surgical History   History reviewed. No pertinent surgical history.        Patient has no known allergies.      MEDICATIONS           Prior to Admission medications    Medication Sig Start Date End Date Taking? Authorizing Provider   melatonin 3  "MG tablet Take 3 mg by mouth nightly as needed for sleep 3/8/24   Yes Reported, Patient         PHYSICAL EXAM/ROS      General: Awake and alert, NAD  HEENT: EOMI, no scleral icterus, no injection of conjunctivae, moist mucus membranes  Respiratory: Breathing comfortably   Extremities: No cyanosis, clubbing, or edema   Skin: No gross rash, no bruising  Neuro: CN II-XII intact, no focal deficits             LABS   No results found for this or any previous visit (from the past 24 hour(s)).       MENTAL STATUS EXAM   Vitals: BP 99/68   Pulse 84   Temp 98.9  F (37.2  C) (Tympanic)   Resp 16   SpO2 100%      Appearance: Alert, oriented, dressed in hospital scrubs  Attitude: Cooperative   Eye Contact: Fair  Mood: \"fine\"  Affect: irritated  Speech: Normal range. Normal rhythm   Psychomotor Behavior: No tremor, rigidity, akathisia, or psychomotor retardation    Thought Process: Logical, goal directed   Associations: No loose associations   Thought Content: Denies SI. No SIB. Denies AVH. No evidence of delusional thought  Insight: poor  Judgment: poor  Oriented to: Person, place, and time  Attention Span and Concentration: Intact  Recent and Remote Memory: Intact  Language: English with appropriate syntax and vocabulary  Fund of Knowledge: Average  Muscle Strength and Tone: Grossly normal  Gait and Station: Grossly normal         ASSESSMENT      This is a 28 year old female with a PMH of substance-induced psychosis, depression, anxiety, polysubstance dependence and concern for bipolar affective disorder, presents to emergency department accompanied by police after coming from a homeless shelter proclaiming Christian persecution.  On psychiatric interview, she is alert and oriented in all spheres and it is clear that she is frustrated by her current housing situation and vocalizing that she is in the hospital because nobody has helped her secure housing.  She states she does not suffer from mental illness and that she is " "being attacked for being a Confucianist which is why she can't get housing. She is not willing to entertain any other hypotheses for why this may be, including psychosis or steve.  She denies suicidal ideation, no plan, no intent. She denies homicidal ideation, no plan, no intent (although it should be noted that she hopes everyone in the hospital will die once she found out she was being discharged, but no vocalized plan and she was able to calm and depart with security).  Do not doubt that she is having difficulty finding a longterm housing solution but at this point she is not interested in therapy, not interested in medications and not interested in engaging in any unit programming. She does not meet criteria for inpatient psychiatric hospitalization and we will dismiss her from the hospital.           DIAGNOSES      #Adjustment Disorder with mixed disturbance  #hx of major depressive disorder  #hx of polysubstance abuse          CONSULTS      Offered CD assessment, she declined          PLAN/HOSPITAL COURSE      Legal status: None     Patient was admitted to unit 5 due to the aforementioned presentation. The patient was placed under 15 minute checks to ensure patient safety. The patient participated in unit programming and groups as able.     Ms. Mak did not require seclusion/restraint during hospitalization.      We reviewed with Ms. Mak current and past medication trials including duration, dose, response and side effects. During this hospitalization, the following changes to the patient's psychotropic medications were made:     Not willing to take any psychotropic medications.  Medication offered for relief of anxiety however she declined stating she prefers to do things \"natural\"     With these changes and supports the patient noticed improvement in their symptoms and felt sufficiently ready for discharge. As a result, Fiona Mak was discharged. At the time of discharge, Fiona Mak was determined " to not be a danger to self or others. At the current time of discharge, the patient does not meet criteria for involuntary hospitalization. On the day of discharge, the patient reports that they do not have suicidal or homicidal ideation. Steps taken to minimize risk include: assessing patient s behavior and thought process daily during hospital stay, discharging patient with adequate plan for follow up for mental and physical health and discussing safety plan of returning to the hospital should the patient ever have thoughts of harming themselves or others. Therefore, based on all available evidence including the factors cited above, the patient does not appear to be at imminent risk for self-harm, and is appropriate for outpatient level of care. However, if patient uses substances or is medication non-adherent, their risk of decompensation and SI will be elevated. This was discussed with the patient.         TREATMENT TEAM CARE PLAN      Progress: Improved.     Continued Stay Criteria/Rationale: Discharge today.     Medical/Physical: No acute issues.     Precautions:  none            Plan: Discharge     Rationale for change in precautions or plan: Discharge.     Participants: Andrew Philippe MD, Nursing, SW, OT.     The patient's care was discussed with the treatment team and chart notes were reviewed.         DISCHARGE MEDICATIONS      Discharge Medication List as of 8/14/2024 10:39 AM              CONTINUE these medications which have NOT CHANGED     Details   melatonin 3 MG tablet Take 3 mg by mouth nightly as needed for sleep, Historical                   STOP taking these medications         cetirizine (ZYRTEC) 10 MG tablet Comments:   Reason for Stopping:            FLUoxetine (PROZAC) 20 MG capsule Comments:   Reason for Stopping:            FLUoxetine (PROZAC) 40 MG capsule Comments:   Reason for Stopping:            fluticasone (FLONASE) 50 MCG/ACT nasal spray Comments:   Reason for Stopping:             hydrOXYzine HCl (ATARAX) 25 MG tablet Comments:   Reason for Stopping:                           DISCHARGE PLAN      1.  Education given regarding diagnostic and treatment options with risks, benefits and alternatives with adequate verbalization of understanding.  2.  Discharge to homelessness . Upon detailed review of risk factors, patient amenable for release.   3.  Continue aforementioned medications and associated medication changes with follow-up by outpatient provider.  4.  Crisis management planning in place.    5.  Nursing and  to review further discharge recommendations.   6.  Patient is being discharged with the following appointments:     See AVS.  Resources provided on dismissal         ADMISSION/DISCHARGE SERVICES PROVIDED      80 minutes spent on discharge services, including:  Final examination of patient.  Review and discussion of hospital stay.  Instructions for continued outpatient care/goals.  Preparation of discharge records.  Preparation of medications refills and new prescriptions.  Preparation of applicable referral forms.          ATTESTATION    Andrew Philippe MD  [facility]  Type of Service: video visit for mental health treatment  Reason for Video Visit: COVID-19 [place]  Originating Site (patient location): [facility]  Distant Site (provider location): Remote Location  Mode of Communication: Video Conference via [employer]  Time of Service: Date: 08/14/2024 , Start: 08:00,  Stop: 09:00

## 2024-08-14 NOTE — PLAN OF CARE
Problem: Adult Behavioral Health Plan of Care  Goal: Patient-Specific Goal (Individualization)  Description: Patient will sleep at least 6 hours per night.  Patient will eat at least 75% of meals.  Patient will work with treatment team and follow recommendations.  Patient will be medication compliant.   Outcome: Progressing   Goal Outcome Evaluation:       Face to face shift report received from RN. Rounding completed, pt observed.Client rested in room for 0 hours with eyes closed and respirations noted.

## 2024-08-14 NOTE — H&P
"[facility] PSYCHIATRY   HISTORY AND PHYSICAL/DISCHARGE SUMMARY     ADMISSION/DISCHARGE DATA     Fiona Mak MRN# [id number]   Age: 28 year old YOB: 1995     Date of Admission: 8/13/2024  Date of Discharge: August 14, 2024  Discharge Provider: Andrew Philippe MD       CHIEF COMPLAINT   \"I need you to find me a place to live.\"       HISTORY OF PRESENT ILLNESS     Per ED, \"Fiona Mak is a 28 year old individual with history of anxiety, major depressive depression, ADD, comes in via law enforcement for psychiatric evaluation.  Patient apparently is homeless and stating that everybody is after her due to her being a \"Taoist\".  States that everything she does \"lands her back into homelessness\".  Patient denies suicidal ideation but states \"the people who are persecuting me might as well kill me and get it done with\".\"    Per DEC assessment, \"bella Mak presents to the ED via police. Patient is presenting to the ED for the following concerns: Verbal agitation, Paranoia.   Factors that make the mental health crisis life threatening or complex are:  Presents via police from shelter - \"my face is a giant target wherever I go\".  Pt reports the other residents at the shelter are harassing her - that they are instigating drama. Pt identifies that she used to wear a cross, but she has taken it off because she is targeted for being Taoist - \"the people who are persecuting me might as well kill me and get it done with\". Thought content delusional - pt paranoid..       On examination, she is met in her room. She is alert and oriented in all spheres. She states \"I need you to find me a place to live\". She states she finds it suspicious that she has a  and that person has not been able to secure a place that she finds suitable in over six months. She believes this is occurring because she is a Holiness.  She states that it is \"bogus\" that people continue to call her mentally ill.  She does " "acknowledge that her current situation is stressful and she is offered medications to target anxiety, but states she will do things \"natrual\". She is not interested in participating in unit acitvities and does not feel she needs a therapist. She states that she wishes to sleep.  We does not wish to share with writer reasons for why she was previously hospitalized and again states that the hospital may as well be persecuting her as well.  Discussed we are not able to solve her housing crisis for her to which she states \"you better\".  She does not vocalize SI, no plna, no intent. Denies HI, no plan, no intent.  She is informed she will be discharged.        PSYCHIATRIC HISTORY     Hx of therapy & med mgmt, none currently - last took any medication in approx 2018. Hx of IP MH admission - unclear when last admitted.     4 pending legal charges - details unknown       SUBSTANCE USE HISTORY   History   Drug Use Unknown       Social History    Substance and Sexual Activity      Alcohol use: Not Currently      History   Smoking Status    Some Days    Types: Cigarettes   Smokeless Tobacco    Never     Hx of polysubstance abuse        SOCIAL HISTORY   Social History     Socioeconomic History    Marital status: Single     Spouse name: Not on file    Number of children: Not on file    Years of education: Not on file    Highest education level: Not on file   Occupational History    Not on file   Tobacco Use    Smoking status: Some Days     Types: Cigarettes    Smokeless tobacco: Never    Tobacco comments:     non smoking home   Substance and Sexual Activity    Alcohol use: Not Currently    Drug use: Not Currently    Sexual activity: Not on file   Other Topics Concern    Not on file   Social History Narrative    Not on file     Social Determinants of Health     Financial Resource Strain: High Risk (2/26/2024)    Received from [facility]    Overall Financial Resource Strain (CARDIA)     " Difficulty of Paying Living Expenses: Very hard   Food Insecurity: Food Insecurity Present (8/2/2024)    [facility]    Hunger Vital Sign     Worried About Running Out of Food in the Last Year: Often true     Ran Out of Food in the Last Year: Often true   Transportation Needs: Unmet Transportation Needs (8/2/2024)    Received from [facility]    PRAPARE - Transportation     Lack of Transportation (Medical): Yes     Lack of Transportation (Non-Medical): Yes   Physical Activity: Not on file   Stress: Not on file   Social Connections: Unknown (8/22/2023)    [facility]    Social Connections     Frequency of Communication with Friends and Family: Not on file   Interpersonal Safety: Not At Risk (8/1/2024)    Received from [facility]    EH IP Custom IPV     Do you feel UNSAFE in any of your personal relationships with your family members or any other acquaintances?: No   Housing Stability: High Risk (8/2/2024)    Received from [facility]    Housing Stability Vital Sign     Unable to Pay for Housing in the Last Year: Yes     Number of Times Moved in the Last Year: 3     Homeless in the Last Year: Yes     Homeless, will not share other details       FAMILY HISTORY   History reviewed. No pertinent family history.      PAST MEDICAL HISTORY   History reviewed. No pertinent past medical history.    History reviewed. No pertinent surgical history.    Patient has no known allergies.     MEDICATIONS   Prior to Admission medications    Medication Sig Start Date End Date Taking? Authorizing Provider   melatonin 3 MG tablet Take 3 mg by mouth nightly as needed for sleep 3/8/24  Yes Reported, Patient        PHYSICAL EXAM/ROS     General: Awake and alert, NAD  HEENT: EOMI, no scleral  "icterus, no injection of conjunctivae, moist mucus membranes  Respiratory: Breathing comfortably   Extremities: No cyanosis, clubbing, or edema   Skin: No gross rash, no bruising  Neuro: CN II-XII intact, no focal deficits          LABS   No results found for this or any previous visit (from the past 24 hour(s)).      MENTAL STATUS EXAM   Vitals: BP 99/68   Pulse 84   Temp 98.9  F (37.2  C) (Tympanic)   Resp 16   SpO2 100%     Appearance: Alert, oriented, dressed in hospital scrubs  Attitude: Cooperative   Eye Contact: Fair  Mood: \"fine\"  Affect: irritated  Speech: Normal range. Normal rhythm   Psychomotor Behavior: No tremor, rigidity, akathisia, or psychomotor retardation    Thought Process: Logical, goal directed   Associations: No loose associations   Thought Content: Denies SI. No SIB. Denies AVH. No evidence of delusional thought  Insight: poor  Judgment: poor  Oriented to: Person, place, and time  Attention Span and Concentration: Intact  Recent and Remote Memory: Intact  Language: English with appropriate syntax and vocabulary  Fund of Knowledge: Average  Muscle Strength and Tone: Grossly normal  Gait and Station: Grossly normal       ASSESSMENT     This is a 28 year old female with a PMH of substance-induced psychosis, depression, anxiety, polysubstance dependence and concern for bipolar affective disorder, presents to emergency department accompanied by police after coming from a homeless shelter proclaiming Taoist persecution.  On psychiatric interview, she is alert and oriented in all spheres and it is clear that she is frustrated by her current housing situation and vocalizing that she is in the hospital because nobody has helped her secure housing.  She states she does not suffer from mental illness and that she is being attacked for being a Religion which is why she can't get housing. She is not willing to entertain any other hypotheses for why this may be, including psychosis or steve.  She " "denies suicidal ideation, no plan, no intent. She denies homicidal ideation, no plan, no intent (although it should be noted that she hopes everyone in the hospital will die once she found out she was being discharged, but no vocalized plan and she was able to calm and depart with security).  Do not doubt that she is having difficulty finding a longterm housing solution but at this point she is not interested in therapy, not interested in medications and not interested in engaging in any unit programming. She does not meet criteria for inpatient psychiatric hospitalization and we will dismiss her from the hospital.         DIAGNOSES     #Adjustment Disorder with mixed disturbance  #hx of major depressive disorder  #hx of polysubstance abuse        CONSULTS     Offered CD assessment, she declined        PLAN/HOSPITAL COURSE     Legal status: None    Patient was admitted to unit 5 due to the aforementioned presentation. The patient was placed under 15 minute checks to ensure patient safety. The patient participated in unit programming and groups as able.    Ms. Mak did not require seclusion/restraint during hospitalization.     We reviewed with Ms. Mak current and past medication trials including duration, dose, response and side effects. During this hospitalization, the following changes to the patient's psychotropic medications were made:    Not willing to take any psychotropic medications.  Medication offered for relief of anxiety however she declined stating she prefers to do things \"natural\"    With these changes and supports the patient noticed improvement in their symptoms and felt sufficiently ready for discharge. As a result, Fiona Mak was discharged. At the time of discharge, Fiona Mak was determined to not be a danger to self or others. At the current time of discharge, the patient does not meet criteria for involuntary hospitalization. On the day of discharge, the patient reports that they do " not have suicidal or homicidal ideation. Steps taken to minimize risk include: assessing patient s behavior and thought process daily during hospital stay, discharging patient with adequate plan for follow up for mental and physical health and discussing safety plan of returning to the hospital should the patient ever have thoughts of harming themselves or others. Therefore, based on all available evidence including the factors cited above, the patient does not appear to be at imminent risk for self-harm, and is appropriate for outpatient level of care. However, if patient uses substances or is medication non-adherent, their risk of decompensation and SI will be elevated. This was discussed with the patient.       TREATMENT TEAM CARE PLAN     Progress: Improved.    Continued Stay Criteria/Rationale: Discharge today.    Medical/Physical: No acute issues.    Precautions:  none          Plan: Discharge    Rationale for change in precautions or plan: Discharge.    Participants: Andrew Philippe MD, Nursing, SW, OT.    The patient's care was discussed with the treatment team and chart notes were reviewed.       DISCHARGE MEDICATIONS     Discharge Medication List as of 8/14/2024 10:39 AM        CONTINUE these medications which have NOT CHANGED    Details   melatonin 3 MG tablet Take 3 mg by mouth nightly as needed for sleep, Historical           STOP taking these medications       cetirizine (ZYRTEC) 10 MG tablet Comments:   Reason for Stopping:         FLUoxetine (PROZAC) 20 MG capsule Comments:   Reason for Stopping:         FLUoxetine (PROZAC) 40 MG capsule Comments:   Reason for Stopping:         fluticasone (FLONASE) 50 MCG/ACT nasal spray Comments:   Reason for Stopping:         hydrOXYzine HCl (ATARAX) 25 MG tablet Comments:   Reason for Stopping:                    DISCHARGE PLAN     1.  Education given regarding diagnostic and treatment options with risks, benefits and alternatives with adequate verbalization of  understanding.  2.  Discharge to homelessness . Upon detailed review of risk factors, patient amenable for release.   3.  Continue aforementioned medications and associated medication changes with follow-up by outpatient provider.  4.  Crisis management planning in place.    5.  Nursing and  to review further discharge recommendations.   6.  Patient is being discharged with the following appointments:    See AVS.  Resources provided on dismissal       ADMISSION/DISCHARGE SERVICES PROVIDED     80 minutes spent on discharge services, including:  Final examination of patient.  Review and discussion of hospital stay.  Instructions for continued outpatient care/goals.  Preparation of discharge records.  Preparation of medications refills and new prescriptions.  Preparation of applicable referral forms.        ATTESTATION    Andrew Philippe MD  [facility]  Type of Service: video visit for mental health treatment  Reason for Video Visit: COVID-19 and [place]  Originating Site (patient location): [facility]  Distant Site (provider location): Remote Location  Mode of Communication: Video Conference via [place] [employer]  Time of Service: Date: 08/14/2024 , Start: 08:00,  Stop: 09:00

## 2024-08-14 NOTE — DISCHARGE INSTRUCTIONS
"Behavioral Discharge Planning and Instructions    Summary: Presents via police from shelter - \"my face is a giant target wherever I go\".     Main Diagnosis: depression, paranoia, delusional thought content.     Health Care Follow-up:     [facility]   [address]  [place] [address] [phone]    [facility]- Crisis bed 10 days   [address]  [place], [place] [address] [phone]    [facility]- 10 days   [address]  [place], [place]  [phone]    Attend all scheduled appointments with your outpatient providers. Call at least 24 hours in advance if you need to reschedule an appointment to ensure continued access to your outpatient providers.     Major Treatments, Procedures and Findings:  You were provided with: a psychiatric assessment, assessed for medical stability, medication evaluation and/or management, group therapy, individual therapy, CD evaluation/assessment, milieu management, and medical interventions    Symptoms to Report: feeling more aggressive, increased confusion, losing more sleep, mood getting worse, or thoughts of suicide    Early warning signs can include: increased depression or anxiety sleep disturbances increased thoughts or behaviors of suicide or self-harm  increased unusual thinking, such as paranoia or hearing voices    Safety and Wellness:  Take all medicines as directed.  Make no changes unless your doctor suggests them.      Follow treatment recommendations.  Refrain from alcohol and non-prescribed drugs.  Ask your support system to help you reduce your access to items that could harm yourself or others. Items could include:  Firearms  Medicines (both prescribed and over-the-counter)  Knives and other sharp objects  Ropes and like materials  Car keys  If there is a concern for safety, call 911. If there is a concern for safety, call 911.    Resources:   Crisis Intervention: 731.285.6626 or 265-560-5257 (TTY: 508.382.5312).  Call anytime for help.  National " "[place] on Mental Illness (www.[place].mauricio.org): 290.244.1407 or 113-075-2780.  [place] Association for Children's Mental Health (www.mac.org): 834.287.2052.  Alcoholics Anonymous (www.alcoholics-anonymous.org): Check your phone book for your local chapter.  Suicide Awareness Voices of Education (SAVE) (www.save.org): 385-251-VOBO (7881)  National Suicide Prevention Line (www.mentalhealthmn.org): 646-688-ABTY (1240)  Mental Health Consumer/Survivor Network of [place] (www.mhcsn.net): 692.586.6385 or 807-015-7193  Mental Health Association of [place] (www.mentalhealth.org): 315.219.5636 or 237-407-7174  Self- Management and Recovery Training., [employer]-- Toll free: 145.939.2108  www.[employer]  Text 4 Life: txt \"LIFE\" to 41378 for immediate support and crisis intervention  Crisis text line: Text \"[place]\" to 779041. Free, confidential, 24/7.  Crisis Intervention: 517.596.1139 or 069-984-6462. Call anytime for help.     General Medication Instructions:   See your medication sheet(s) for instructions.   Take all medicines as directed.  Make no changes unless your doctor suggests them.   Go to all your doctor visits.  Be sure to have all your required lab tests. This way, your medicines can be refilled on time.  Do not use any drugs not prescribed by your doctor.  Avoid alcohol.    Advance Directives:   Scanned document on file with [place]? No scanned doc  Is document scanned? Pt states no documents  Honoring Choices Your Rights Handout: Informed and given  Was more information offered? Pt declined    The Treatment team has appreciated the opportunity to work with you. If you have any questions or concerns about your recent admission, you can contact the unit which can receive your call 24 hours a day, 7 days a week. They will be able to get in touch with a Provider if needed. The unit number is [phone].  "

## 2024-08-14 NOTE — PLAN OF CARE
Discharge Note    Patient Discharged to [facility] on 8/14/2024 11:00 AM via Taxi accompanied to door by security and [address] [place] [address] staff.     Patient informed of discharge instructions in AVS. patient verbalizes understanding and denies having any questions pertaining to AVS. Patient stable at time of discharge. Patient denies SI, HI, and thoughts of self harm at time of discharge. All personal belongings returned to patient.  No discharge medications. Psych evaluation, history and physical, AVS, and discharge summary faxed to next level of care- per .     Patient angry and irritable about discharge.  Refusing to go to a shelter stating she won't go anywhere but a board and lodge.  Explained to patient that she does not meet criteria for inpatient hospitalization and we be leaving at 11.  Patient states that she is going to condemn all of [place] and hopes that everyone here will die.  Patient did agree to put on clothes and walked to cab with staff and security.        Judi Gore RN  8/14/2024  11:39 AM  Problem: Adult Behavioral Health Plan of Care  Goal: Patient-Specific Goal (Individualization)  Description: Patient will sleep at least 6 hours per night.  Patient will eat at least 75% of meals.  Patient will work with treatment team and follow recommendations.  Patient will be medication compliant.   Outcome: Adequate for Care Transition     Problem: Thought Process Alteration  Goal: Optimal Thought Clarity  Outcome: Adequate for Care Transition

## 2024-08-14 NOTE — PROGRESS NOTES
08/13/24 1909   Patient Belongings   Did you bring any home meds/supplements to the hospital?  No   Patient Belongings sent to security per site process;remains on inpatient care area;locker   Patient Belongings Put in Hospital Secure Location (Security or Locker, etc.) cash/credit card;cell phone/electronics;keys;wallet   Belongings Search Yes  (teal bra, tan [employer] shirt, grey pants, lighter, 2 bus tickets, matchbook, 1 quarter,)   Clothing Search Yes   Second Staff [place]     List items sent to safe: Grey Cell Phone w/ Cracked and taped over screen, Black Leather Wallet, 1 Key, Temporary [facility] Resident Permit, EBT Card, 2 [employer] Cards, 6 Visa Cards, Mastercard,     Addendum: Nude Underwear remaining with patient, Blue slides put in patient belongs room    All other belongings put in assigned cubby in belongings room.   teal bra, tan [employer] shirt, grey pants, lighter, 2 bus tickets, matchbook, 1 quarter,    I have reviewed my belongings list on admission and verify that it is correct.     Patient signature_______________________________    Second staff witness (if patient unable to sign) ______________________________       I have received all my belongings at discharge.    Patient signature________________________________    Ketan   8/13/2024  7:13 PM

## 2024-08-14 NOTE — MEDICATION SCRIBE - ADMISSION MEDICATION HISTORY
Medication Scribe Admission Medication History    Admission medication history is complete. The information provided in this note is only as accurate as the sources available at the time of the update.    Information Source(s): Patient and CareEverywhere/SureScripts via in-person    Pertinent Information:   Patient manages her own medications and is a good historian. Has not taken her medications since sometime in July due to a lack of living situation. She reports she no longer wants to take hydroxyzine due to it making her very tired. Reports she has neck pain and would like a topical ordered if able. Takes OTC ibu 200 mg 2 tabs daily PRN averaging a couple times per week for pain.     Changes made to PTA medication list:  Added: zyrtec, prozac, flonase (reports not taking due to ineffectiveness), atarax,melatonin  Deleted: None  Changed: None    Allergies reviewed with patient and updates made in EHR: yes    Medication History Completed By: Aziza Mcnamara 8/14/2024 10:02 AM    Medications reconciled for discharge.

## 2024-08-15 ENCOUNTER — TELEPHONE (OUTPATIENT)
Dept: BEHAVIORAL HEALTH | Facility: HOSPITAL | Age: 29
End: 2024-08-15

## 2024-08-15 NOTE — TELEPHONE ENCOUNTER
"[facility] [place] [facility]: Post-Hospital Discharge Note     Situation   Post hospital discharge call placed 08/15/24.      Fiona did not answer. A message was not left as it was not an option.  Messages are left with a call back number should they need to reach staff with any questions, need for additional resources, or need assistance setting up a post hospitalization follow up appointments, if unable to do so independently.    Inpatient Mental Health Admission Information:  Admission Date: 8/13/24  Admission Reason: \"I need you to find me a place to live.\"     Inpatient Mental Health Discharge Information:  Discharge Date: 8/14/24  Discharged to: homeless shelter  Discharge Diagnosis: #Adjustment Disorder with mixed disturbance  #hx of major depressive disorder  #hx of polysubstance abuse     Background    The following information is obtained from the hospital after visit summary and inpatient provider notes.     \"Legal status: None     Patient was admitted to unit 5 due to the aforementioned presentation. The patient was placed under 15 minute checks to ensure patient safety. The patient participated in unit programming and groups as able.     Ms. Mak did not require seclusion/restraint during hospitalization.      We reviewed with Ms. Mak current and past medication trials including duration, dose, response and side effects. During this hospitalization, the following changes to the patient's psychotropic medications were made:     Not willing to take any psychotropic medications.  Medication offered for relief of anxiety however she declined stating she prefers to do things \"natural\"     With these changes and supports the patient noticed improvement in their symptoms and felt sufficiently ready for discharge. As a result, Fiona Mak was discharged. At the time of discharge, Fiona Mak was determined to not be a danger to self or others. At the current time of discharge, the patient does not meet criteria for " "involuntary hospitalization. On the day of discharge, the patient reports that they do not have suicidal or homicidal ideation. Steps taken to minimize risk include: assessing patient s behavior and thought process daily during hospital stay, discharging patient with adequate plan for follow up for mental and physical health and discussing safety plan of returning to the hospital should the patient ever have thoughts of harming themselves or others. Therefore, based on all available evidence including the factors cited above, the patient does not appear to be at imminent risk for self-harm, and is appropriate for outpatient level of care. However, if patient uses substances or is medication non-adherent, their risk of decompensation and SI will be elevated. This was discussed with the patient.\"        Post Discharge Assessment   How have your symptoms been since being discharged from the hospital? Unable to reach patient  Do you have your discharge instructions/after visit summary? NA  Do you have any questions related to your discharge instructions? NA    Discharge Medication Assessment   Medications were reviewed in full on discharge, including: Medications to be started, medications to be stopped, medications to be continued from preadmission and any side effects.      Prescriptions were e-scribed or sent to their preferred pharmacy at discharge and were able to be filled: NA  Do you have any questions about your medications? NA    Outpatient Plan/Future Appointments  Discharge follow up appointment scheduled within 14 days of discharging from hospital? No,    She will schedule her own follow up       Further information, barriers, or follow up this writer has addressed: N/A    "